# Patient Record
Sex: FEMALE | Race: WHITE | NOT HISPANIC OR LATINO | Employment: OTHER | URBAN - METROPOLITAN AREA
[De-identification: names, ages, dates, MRNs, and addresses within clinical notes are randomized per-mention and may not be internally consistent; named-entity substitution may affect disease eponyms.]

---

## 2017-02-17 RX ORDER — PANTOPRAZOLE SODIUM 40 MG/1
40 TABLET, DELAYED RELEASE ORAL 2 TIMES DAILY
Qty: 180 TAB | Refills: 3 | Status: SHIPPED | OUTPATIENT
Start: 2017-02-17 | End: 2017-09-21 | Stop reason: SDUPTHER

## 2017-03-23 ENCOUNTER — HOSPITAL ENCOUNTER (EMERGENCY)
Facility: MEDICAL CENTER | Age: 62
End: 2017-03-23
Attending: EMERGENCY MEDICINE
Payer: COMMERCIAL

## 2017-03-23 ENCOUNTER — APPOINTMENT (OUTPATIENT)
Dept: RADIOLOGY | Facility: MEDICAL CENTER | Age: 62
End: 2017-03-23
Attending: EMERGENCY MEDICINE
Payer: COMMERCIAL

## 2017-03-23 VITALS
WEIGHT: 115.08 LBS | TEMPERATURE: 100 F | BODY MASS INDEX: 19.65 KG/M2 | SYSTOLIC BLOOD PRESSURE: 131 MMHG | DIASTOLIC BLOOD PRESSURE: 61 MMHG | HEART RATE: 73 BPM | OXYGEN SATURATION: 97 % | HEIGHT: 64 IN | RESPIRATION RATE: 16 BRPM

## 2017-03-23 DIAGNOSIS — J06.9 UPPER RESPIRATORY TRACT INFECTION, UNSPECIFIED TYPE: ICD-10-CM

## 2017-03-23 DIAGNOSIS — R51.9 ACUTE NONINTRACTABLE HEADACHE, UNSPECIFIED HEADACHE TYPE: ICD-10-CM

## 2017-03-23 PROCEDURE — 700102 HCHG RX REV CODE 250 W/ 637 OVERRIDE(OP): Performed by: EMERGENCY MEDICINE

## 2017-03-23 PROCEDURE — 700111 HCHG RX REV CODE 636 W/ 250 OVERRIDE (IP): Performed by: EMERGENCY MEDICINE

## 2017-03-23 PROCEDURE — 96372 THER/PROPH/DIAG INJ SC/IM: CPT

## 2017-03-23 PROCEDURE — A9270 NON-COVERED ITEM OR SERVICE: HCPCS | Performed by: EMERGENCY MEDICINE

## 2017-03-23 PROCEDURE — 70450 CT HEAD/BRAIN W/O DYE: CPT

## 2017-03-23 PROCEDURE — 99284 EMERGENCY DEPT VISIT MOD MDM: CPT

## 2017-03-23 RX ORDER — BUTALBITAL, ACETAMINOPHEN AND CAFFEINE 50; 325; 40 MG/1; MG/1; MG/1
1 TABLET ORAL EVERY 4 HOURS PRN
Qty: 20 TAB | Refills: 0 | Status: SHIPPED | OUTPATIENT
Start: 2017-03-23 | End: 2018-06-18

## 2017-03-23 RX ORDER — BUTALBITAL, ACETAMINOPHEN AND CAFFEINE 50; 325; 40 MG/1; MG/1; MG/1
1 TABLET ORAL ONCE
Status: COMPLETED | OUTPATIENT
Start: 2017-03-23 | End: 2017-03-23

## 2017-03-23 RX ORDER — KETOROLAC TROMETHAMINE 30 MG/ML
30 INJECTION, SOLUTION INTRAMUSCULAR; INTRAVENOUS ONCE
Status: COMPLETED | OUTPATIENT
Start: 2017-03-23 | End: 2017-03-23

## 2017-03-23 RX ORDER — ACETAMINOPHEN 325 MG/1
650 TABLET ORAL ONCE
Status: COMPLETED | OUTPATIENT
Start: 2017-03-23 | End: 2017-03-23

## 2017-03-23 RX ADMIN — KETOROLAC TROMETHAMINE 30 MG: 30 INJECTION, SOLUTION INTRAMUSCULAR; INTRAVENOUS at 11:16

## 2017-03-23 RX ADMIN — ACETAMINOPHEN 650 MG: 325 TABLET, FILM COATED ORAL at 11:16

## 2017-03-23 RX ADMIN — BUTALBITAL, ACETAMINOPHEN, AND CAFFEINE 1 TABLET: 50; 325; 40 TABLET ORAL at 13:54

## 2017-03-23 ASSESSMENT — PAIN SCALES - GENERAL
PAINLEVEL_OUTOF10: 8
PAINLEVEL_OUTOF10: 8

## 2017-03-23 NOTE — ED AVS SNAPSHOT
3/23/2017          Camryn Peter  [address] [place] [address]    Dear Camryn:    [facility] wants to ensure your discharge home is safe and you or your loved ones have had all your questions answered regarding your care after you leave the hospital.    You may receive a telephone call within two days of your discharge.  This call is to make certain you understand your discharge instructions as well as ensure we provided you with the best care possible during your stay with us.     The call will only last approximately 3-5 minutes and will be done by a nurse.    Once again, we want to ensure your discharge home is safe and that you have a clear understanding of any next steps in your care.  If you have any questions or concerns, please do not hesitate to contact us, we are here for you.  Thank you for choosing [facility] for your healthcare needs.    Sincerely,    David Navarro    [facility]

## 2017-03-23 NOTE — ED AVS SNAPSHOT
Home Care Instructions                                                                                                                Camryn Peter   MRN: [id number]    Department:  [facility], Emergency Dept   Date of Visit:  3/23/2017            [facility], Emergency Dept    [address] [place] [id number]    Phone:  [phone]      You were seen by     Madie Garcia M.D.      Your Diagnosis Was     Acute nonintractable headache, unspecified headache type     R51       These are the medications you received during your hospitalization from 03/23/2017 1012 to 03/23/2017 1406     Date/Time Order Dose Route Action    03/23/2017 1116 ketorolac (TORADOL) injection 30 mg 30 mg Intramuscular Given    03/23/2017 1116 acetaminophen (TYLENOL) tablet 650 mg 650 mg Oral Given    03/23/2017 1354 acetaminophen/caffeine/butalbital 325-40-50 mg (FIORICET) -40 MG per tablet 1 Tab 1 Tab Oral Given      Follow-up Information     1. Follow up with Lynn Buckley D.O..    Specialty:  Family Medicine    Why:  As needed    Contact information    Lexa Herrera ashley  [address] [address] [phone]          2. Follow up with [facility], Emergency Dept.    Specialty:  Emergency Medicine    Why:  If symptoms worsen, difficulty breathing or other concerns    Contact information    [address] [place] [id number] [phone]      Medication Information     Review all of your home medications and newly ordered medications with your primary doctor and/or pharmacist as soon as possible. Follow medication instructions as directed by your doctor and/or pharmacist.     Please keep your complete medication list with you and share with your physician. Update the information when medications are discontinued, doses are changed, or new medications (including over-the-counter products) are added; and carry medication information at all times in the event of  emergency situations.               Medication List      START taking these medications        Instructions    Morning Afternoon Evening Bedtime    acetaminophen/caffeine/butalbital 325-40-50 mg -40 MG Tabs   Last time this was given:  1 Tab on 3/23/2017  1:54 PM   Commonly known as:  FIORICET        Take 1 Tab by mouth every four hours as needed for Headache.   Dose:  1 Tab                          ASK your doctor about these medications        Instructions    Morning Afternoon Evening Bedtime    ARNICA MONTANA PO        Take  by mouth.                        BIOTIN PO        Take 10,000 mcg by mouth every day.   Dose:  11312 mcg                        Iron 240 (27 FE) MG Tabs        Take  by mouth every day.                        oxycodone-acetaminophen 5-325 MG Tabs   Commonly known as:  PERCOCET        Take 1-2 Tabs by mouth every 6 hours as needed for Moderate Pain.   Dose:  1-2 Tab                        pantoprazole 40 MG Tbec   Commonly known as:  PROTONIX        Take 1 Tab by mouth 2 times a day.   Dose:  40 mg                        PREMARIN 0.625 MG/GM Crea   Generic drug:  conjugated estrogen        Insert 0.5 g in vagina 2 times a week.   Dose:  0.5 g                        VITAMIN B-12 PO        Take 10,000 mcg by mouth every day.   Dose:  45594 mcg                        Vitamin D3 3000 UNITS Tabs        Take  by mouth every day.                             Where to Get Your Medications      You can get these medications from any pharmacy     Bring a paper prescription for each of these medications    - acetaminophen/caffeine/butalbital 325-40-50 mg -40 MG Tabs            Procedures and tests performed during your visit     CT-HEAD W/O        Discharge Instructions       Migraine Headache  A migraine headache is very bad, throbbing pain on one or both sides of your head. Talk to your doctor about what things may bring on (trigger) your migraine headaches.  HOME CARE  · Only take medicines  as told by your doctor.  · Lie down in a dark, quiet room when you have a migraine.  · Keep a journal to find out if certain things bring on migraine headaches. For example, write down:  ¨ What you eat and drink.  ¨ How much sleep you get.  ¨ Any change to your diet or medicines.  · Lessen how much alcohol you drink.  · Quit smoking if you smoke.  · Get enough sleep.  · Lessen any stress in your life.  · Keep lights dim if bright lights bother you or make your migraines worse.  GET HELP RIGHT AWAY IF:   · Your migraine becomes really bad.  · You have a fever.  · You have a stiff neck.  · You have trouble seeing.  · Your muscles are weak, or you lose muscle control.  · You lose your balance or have trouble walking.  · You feel like you will pass out (faint), or you pass out.  · You have really bad symptoms that are different than your first symptoms.  MAKE SURE YOU:   · Understand these instructions.  · Will watch your condition.  · Will get help right away if you are not doing well or get worse.     This information is not intended to replace advice given to you by your health care provider. Make sure you discuss any questions you have with your health care provider.     Document Released: 09/26/2009 Document Revised: 03/11/2013 Document Reviewed: 08/25/2014  Elsevier Interactive Patient Education ©2016 [employer]            Patient Information     Patient Information    Following emergency treatment: all patient requiring follow-up care must return either to a private physician or a clinic if your condition worsens before you are able to obtain further medical attention, please return to the emergency room.     Billing Information    At [facility], we work to make the billing process streamlined for our patients.  Our Representatives are here to answer any questions you may have regarding your hospital bill.  If you have insurance coverage and have supplied your insurance information to us, we will submit a  claim to your insurer on your behalf.  Should you have any questions regarding your bill, we can be reached online or by phone as follows:  Online: You are able pay your bills online or live chat with our representatives about any billing questions you may have. We are here to help Monday - Friday from 8:00am to 7:30pm and 9:00am - 12:00pm on Saturdays.  Please visit [email or web address]  for more information.   Phone:  [phone] or [phone]    Please note that your emergency physician, surgeon, pathologist, radiologist, anesthesiologist, and other specialists are not employed by [facility] and will therefore bill separately for their services.  Please contact them directly for any questions concerning their bills at the numbers below:     Emergency Physician Services:  [phone]  [facility]:  [phone]  [facility]:  [phone]  [facility]:  [phone]    1. Your final bill may vary from the amount quoted upon discharge if all procedures are not complete at that time, or if your doctor has additional procedures of which we are not aware. You will receive an additional bill if you return to the Emergency Department at [facility] for suture removal regardless of the facility of which the sutures were placed.     2. Please arrange for settlement of this account at the emergency registration.    3. All self-pay accounts are due in full at the time of treatment.  If you are unable to meet this obligation then payment is expected within 4-5 days.     4. If you have had radiology studies (CT, X-ray, Ultrasound, MRI), you have received a preliminary result during your emergency department visit. Please contact the radiology department [phone] to receive a copy of your final result. Please discuss the Final result with your primary physician or with the follow up physician provided.     Crisis Hotline:  National  Crisis Hotline:  4-446-ZNOPQVN or 1-850.471.2858  [place] Crisis Hotline:    1-321.767.8292 or 172-968-0391         ED Discharge Follow Up Questions    1. In order to provide you with very good care, we would like to follow up with a phone call in the next few days.  May we have your permission to contact you?     YES /  NO    2. What is the best phone number to call you? (       )_____-__________    3. What is the best time to call you?      Morning  /  Afternoon  /  Evening                   Patient Signature:  ____________________________________________________________    Date:  ____________________________________________________________

## 2017-03-23 NOTE — ED AVS SNAPSHOT
Best Before Media Access Code: Activation code not generated  Current Best Before Media Status: Active    [employer]  A secure, online tool to manage your health information     [employer]’s Best Before Media® is a secure, online tool that connects you to your personalized health information from the privacy of your home -- day or night - making it very easy for you to manage your healthcare. Once the activation process is completed, you can even access your medical information using the Best Before Media anabel, which is available for free in the Apple Anabel store or Google Play store.     Best Before Media provides the following levels of access (as shown below):   My Chart Features   [facility] Primary Care Doctor [facility]  Specialists [facility]  Urgent  Care Non-[facility]  Primary Care  Doctor   Email your healthcare team securely and privately 24/7 X X X X   Manage appointments: schedule your next appointment; view details of past/upcoming appointments X      Request prescription refills. X      View recent personal medical records, including lab and immunizations X X X X   View health record, including health history, allergies, medications X X X X   Read reports about your outpatient visits, procedures, consult and ER notes X X X X   See your discharge summary, which is a recap of your hospital and/or ER visit that includes your diagnosis, lab results, and care plan. X X       How to register for Best Before Media:  1. Go to  [email or web address].  2. Click on the Sign Up Now box, which takes you to the New Member Sign Up page. You will need to provide the following information:  a. Enter your Best Before Media Access Code exactly as it appears at the top of this page. (You will not need to use this code after you’ve completed the sign-up process. If you do not sign up before the expiration date, you must request a new code.)   b. Enter your date of birth.   c. Enter your home email address.   d. Click Submit, and follow the next screen’s instructions.  3. Create a Best Before Media ID. This will  be your [email or web address] login ID and cannot be changed, so think of one that is secure and easy to remember.  4. Create a [email or web address] password. You can change your password at any time.  5. Enter your Password Reset Question and Answer. This can be used at a later time if you forget your password.   6. Enter your e-mail address. This allows you to receive e-mail notifications when new information is available in [email or web address].  7. Click Sign Up. You can now view your health information.    For assistance activating your [email or web address] account, call [phone]

## 2017-03-23 NOTE — ED PROVIDER NOTES
ED Provider Note    Scribed for Madie Garcia M.D. by Michelle Guerra. 3/23/2017, 10:34 AM.    Primary Care Provider: Lynn Buckley D.O.  Means of arrival: Walk-in  History obtained from: Patient  History limited by: None    CHIEF COMPLAINT  Chief Complaint   Patient presents with   • Headache     Pt BIB self to triage for c/o HA for three days. Pt reports pain to be in left temple/ no hx of migraines/ no relief from tylenol/ no slurred speech/ unilateral defecits.    • Cough   • Fever       HPI  Camryn Peter is a 62 y.o. female who presents to the Emergency Department for headache and fever onset 3 days ago. The patient reports she initially developed a cough 3 days ago, but woke in the middle of the night with a throbbing headache. She has been medicating with Tylenol with no relief, last dose 12AM yesterday, and notes minimal neck stiffness. Patient states she has never experienced a similar headache previously and denies symptoms of production of phlegm or photophobia. The patient has past history of gastric sleeve surgery 3 years ago, but cannot take any NSAIDs.     REVIEW OF SYSTEMS  Pertinent positives include headache, fever, cough, and minimal neck stiffness. Pertinent negatives include no production of phlegm or photophobia. All other systems reviewed and negative. C.     PAST MEDICAL HISTORY   has a past medical history of High cholesterol; Sleep apnea; and Prediabetes.    SOCIAL HISTORY  Social History   Substance Use Topics   • Smoking status: Never Smoker    • Alcohol Use: 0.0 oz/week     0 Standard drinks or equivalent per week      Comment: once a week      History   Drug Use No       SURGICAL HISTORY   has past surgical history that includes gyn surgery; tonsillectomy (1959); appendectomy (1972); hysterectomy, vaginal (1984); blepharoplasty (1997); anesth,surg breast reconstructive (1999); liposuction (2000); gastric sleeve laparoscopy (2014); dental surgery (2014); other (2006); other  "(2011); other (2013); rhytidectomy (2/2/2015); platysmaplasty (2/2/2015); blepharoplasty (2/2/2015); and cholecystectomy.     CURRENT MEDICATIONS  No current facility-administered medications for this encounter.    Current outpatient prescriptions:   •  acetaminophen/caffeine/butalbital 325-40-50 mg (FIORICET) -40 MG Tab, Take 1 Tab by mouth every four hours as needed for Headache., Disp: 20 Tab, Rfl: 0  •  pantoprazole (PROTONIX) 40 MG Tablet Delayed Response, Take 1 Tab by mouth 2 times a day., Disp: 180 Tab, Rfl: 3  •  oxycodone-acetaminophen (PERCOCET) 5-325 MG Tab, Take 1-2 Tabs by mouth every 6 hours as needed for Moderate Pain., Disp: 20 Tab, Rfl: 0  •  Homeopathic Products (ARNICA MONTANA PO), Take  by mouth., Disp: , Rfl:   •  Cholecalciferol (VITAMIN D3) 3000 UNITS TABS, Take  by mouth every day., Disp: , Rfl:   •  Cyanocobalamin (VITAMIN B-12 PO), Take 10,000 mcg by mouth every day., Disp: , Rfl:   •  Ferrous Gluconate (IRON) 240 (27 FE) MG TABS, Take  by mouth every day., Disp: , Rfl:   •  BIOTIN PO, Take 10,000 mcg by mouth every day., Disp: , Rfl:   •  conjugated estrogen (PREMARIN) 0.625 MG/GM CREA, Insert 0.5 g in vagina 2 times a week., Disp: , Rfl:     ALLERGIES  No Known Allergies    PHYSICAL EXAM  VITAL SIGNS: /85 mmHg  Pulse 80  Temp(Src) 37.9 °C (100.3 °F)  Resp 16  Ht 1.626 m (5' 4.02\")  Wt 52.2 kg (115 lb 1.3 oz)  BMI 19.74 kg/m2  SpO2 97%  Constitutional: Alert in no apparent distress.  HENT: No signs of trauma, Bilateral external ears normal, Nose normal.   Eyes: Pupils are equal and reactive, Conjunctiva normal, Non-icteric.   Neck: Normal range of motion, No tenderness, Supple, No stridor.   Cardiovascular: Regular rate and rhythm, no murmurs.   Thorax & Lungs: Normal breath sounds, No respiratory distress, No wheezing, No chest tenderness.   Abdomen: Bowel sounds normal, Soft, No tenderness, No masses, No peritoneal signs.  Skin: Warm, Dry, No erythema, No rash. " "  Musculoskeletal:  No major deformities noted.   Neurologic: Alert, moving all extremities without difficulty, no focal deficits.      RADIOLOGY  CT-HEAD W/O   Final Result      1.  No acute intracranial abnormality.   2.  Probable chronic RIGHT medial orbital wall fracture.      The radiologist's interpretation of all radiological studies have been reviewed by me.    COURSE & MEDICAL DECISION MAKING  Pertinent Labs & Imaging studies reviewed. (See chart for details)    10:34 AM - Patient seen and examined at bedside. Patient will be treated with Toradol 30mg IV and Tylenol 650mg IV. Ordered CT-head to evaluate her symptoms.     1:45 PM Reviewed the patient's CT result, which is shown above. These results were discussed with the patient and will be treated with Fioricet -40mg PO. She reports relief after medication administration. The patient will be discharged with Fioricet and should return if symptoms worsen or if new symptoms arise. The patient understands and agrees to plan. /61 mmHg  Pulse 73  Temp(Src) 37.8 °C (100 °F)  Resp 16  Ht 1.626 m (5' 4.02\")  Wt 52.2 kg (115 lb 1.3 oz)  BMI 19.74 kg/m2  SpO2 97%      Your blood pressure is elevated here in the emergency department. Please monitor your blood pressure over the next several days. If your blood pressure continues to be 120/80 or higher please contact your physician for blood pressure management.     The patient will return for new or worsening symptoms and is stable at the time of discharge. Patient was given return precautions. Patient and/or family member verbalizes understanding and will comply.    DISPOSITION:  Patient will be discharged home in stable condition.    FOLLOW UP:  Lynn Buckley D.O.  [address] [address] [phone]      As needed    [facility], Emergency Dept  [address] [facility] [address] [phone]    If symptoms worsen, difficulty breathing or other " concerns    OUTPATIENT MEDICATION:  Discharge Medication List as of 3/23/2017  2:06 PM      START taking these medications    Details   acetaminophen/caffeine/butalbital 325-40-50 mg (FIORICET) -40 MG Tab Take 1 Tab by mouth every four hours as needed for Headache., Disp-20 Tab, R-0, Print Rx Paper           FINAL IMPRESSION  1. Acute nonintractable headache, unspecified headache type    2. Upper respiratory tract infection, unspecified type      This dictation has been created using voice recognition software and/or scribes. The accuracy of the dictation is limited by the abilities of the software and the expertise of the scribes. I expect there may be some errors of grammar and possibly content. I made every attempt to manually correct the errors within my dictation. However, errors related to voice recognition software and/or scribes may still exist and should be interpreted within the appropriate context.     I, Michelle Guerra (Scribe), am scribing for, and in the presence of, Madie Garcia M.D..    Electronically signed by: Michelle Guerra (Scribe), 3/23/2017    IMadie M.D. personally performed the services described in this documentation, as scribed by Michelle Guerra in my presence, and it is both accurate and complete.    The note accurately reflects work and decisions made by me.  Madie Garcia  3/23/2017  4:40 PM

## 2017-03-23 NOTE — ED NOTES
Chief Complaint   Patient presents with   • Headache     Pt BIB self to triage for c/o HA for three days. Pt reports pain to be in left temple/ no hx of migraines/ no relief from tylenol/ no slurred speech/ unilateral defecits.    • Cough   • Fever     Explained to pt triage process, made pt aware to tell this RN of any changes/concerns, pt verbalized understanding of process and instructions given. Pt to ER teresa.

## 2017-09-21 ENCOUNTER — OFFICE VISIT (OUTPATIENT)
Dept: MEDICAL GROUP | Facility: PHYSICIAN GROUP | Age: 62
End: 2017-09-21
Payer: COMMERCIAL

## 2017-09-21 ENCOUNTER — HOSPITAL ENCOUNTER (OUTPATIENT)
Facility: MEDICAL CENTER | Age: 62
End: 2017-09-21
Attending: NURSE PRACTITIONER
Payer: COMMERCIAL

## 2017-09-21 ENCOUNTER — SUPERVISING PHYSICIAN REVIEW (OUTPATIENT)
Dept: MEDICAL GROUP | Facility: PHYSICIAN GROUP | Age: 62
End: 2017-09-21

## 2017-09-21 VITALS
HEART RATE: 71 BPM | DIASTOLIC BLOOD PRESSURE: 58 MMHG | BODY MASS INDEX: 20.49 KG/M2 | WEIGHT: 120 LBS | TEMPERATURE: 99 F | SYSTOLIC BLOOD PRESSURE: 94 MMHG | HEIGHT: 64 IN | RESPIRATION RATE: 14 BRPM | OXYGEN SATURATION: 95 %

## 2017-09-21 DIAGNOSIS — Z23 NEED FOR VACCINATION: ICD-10-CM

## 2017-09-21 DIAGNOSIS — N30.01 ACUTE CYSTITIS WITH HEMATURIA: ICD-10-CM

## 2017-09-21 LAB
APPEARANCE UR: NORMAL
BILIRUB UR STRIP-MCNC: NORMAL MG/DL
COLOR UR AUTO: YELLOW
GLUCOSE UR STRIP.AUTO-MCNC: NORMAL MG/DL
KETONES UR STRIP.AUTO-MCNC: NORMAL MG/DL
LEUKOCYTE ESTERASE UR QL STRIP.AUTO: NORMAL
NITRITE UR QL STRIP.AUTO: NORMAL
PH UR STRIP.AUTO: 6 [PH] (ref 5–8)
PROT UR QL STRIP: NORMAL MG/DL
RBC UR QL AUTO: NORMAL
SP GR UR STRIP.AUTO: 1.02
UROBILINOGEN UR STRIP-MCNC: NORMAL MG/DL

## 2017-09-21 PROCEDURE — 87077 CULTURE AEROBIC IDENTIFY: CPT

## 2017-09-21 PROCEDURE — 87186 SC STD MICRODIL/AGAR DIL: CPT

## 2017-09-21 PROCEDURE — 81002 URINALYSIS NONAUTO W/O SCOPE: CPT | Performed by: NURSE PRACTITIONER

## 2017-09-21 PROCEDURE — 90686 IIV4 VACC NO PRSV 0.5 ML IM: CPT | Performed by: NURSE PRACTITIONER

## 2017-09-21 PROCEDURE — 90471 IMMUNIZATION ADMIN: CPT | Performed by: NURSE PRACTITIONER

## 2017-09-21 PROCEDURE — 87086 URINE CULTURE/COLONY COUNT: CPT

## 2017-09-21 PROCEDURE — 99214 OFFICE O/P EST MOD 30 MIN: CPT | Mod: 25 | Performed by: NURSE PRACTITIONER

## 2017-09-21 RX ORDER — FLUCONAZOLE 100 MG/1
TABLET ORAL
Qty: 2 TAB | Refills: 1 | Status: SHIPPED | OUTPATIENT
Start: 2017-09-21 | End: 2018-01-16

## 2017-09-21 RX ORDER — NITROFURANTOIN 25; 75 MG/1; MG/1
100 CAPSULE ORAL 2 TIMES DAILY
Qty: 20 CAP | Refills: 0 | Status: SHIPPED | OUTPATIENT
Start: 2017-09-21 | End: 2018-01-16

## 2017-09-21 ASSESSMENT — PATIENT HEALTH QUESTIONNAIRE - PHQ9: CLINICAL INTERPRETATION OF PHQ2 SCORE: 0

## 2017-09-21 NOTE — ASSESSMENT & PLAN NOTE
Symptoms started 4 days ago after intercourse. Patient reports that this is the first time she's had a urinary tract infection. She states that she has always been good about emptying her bladder after intercourse. This lasts him she did not. She has in the past had issues with yeast infections with antibiotics. She is requesting to have Diflucan if she is treated with an antibiotic. Patient also states that she has been, in the past, using Premarin estrogen vaginal cream. She states that she ran out approximately 2 months ago. She is requesting refill for that.  Patient reports urgency frequency and burning. Denies fever chills abdominal pain or flank pain at this time. No nausea or vomiting.

## 2017-09-22 DIAGNOSIS — N30.01 ACUTE CYSTITIS WITH HEMATURIA: ICD-10-CM

## 2017-09-22 NOTE — PROGRESS NOTES
I have reviewed and agree with history, assessment and plan for office encounter on 9/21/17 with midlevel provider: MANI Yarbrough.  Face to face encounter/direct observation: No  Suggested changes to plan or follow-up: none   Lynn Buckley D.O.

## 2017-09-24 LAB
BACTERIA UR CULT: ABNORMAL
SIGNIFICANT IND 70042: ABNORMAL
SITE SITE: ABNORMAL
SOURCE SOURCE: ABNORMAL

## 2018-01-12 ENCOUNTER — APPOINTMENT (OUTPATIENT)
Dept: MEDICAL GROUP | Facility: PHYSICIAN GROUP | Age: 63
End: 2018-01-12
Payer: COMMERCIAL

## 2018-01-16 ENCOUNTER — OFFICE VISIT (OUTPATIENT)
Dept: MEDICAL GROUP | Facility: PHYSICIAN GROUP | Age: 63
End: 2018-01-16
Payer: COMMERCIAL

## 2018-01-16 VITALS
BODY MASS INDEX: 20.83 KG/M2 | HEIGHT: 64 IN | OXYGEN SATURATION: 97 % | HEART RATE: 68 BPM | TEMPERATURE: 98.6 F | WEIGHT: 122 LBS | SYSTOLIC BLOOD PRESSURE: 124 MMHG | DIASTOLIC BLOOD PRESSURE: 70 MMHG | RESPIRATION RATE: 12 BRPM

## 2018-01-16 DIAGNOSIS — R53.83 OTHER FATIGUE: ICD-10-CM

## 2018-01-16 DIAGNOSIS — G47.09 OTHER INSOMNIA: ICD-10-CM

## 2018-01-16 DIAGNOSIS — L70.0 ACNE VULGARIS: ICD-10-CM

## 2018-01-16 DIAGNOSIS — Z98.84 S/P BARIATRIC SURGERY: ICD-10-CM

## 2018-01-16 PROCEDURE — 99214 OFFICE O/P EST MOD 30 MIN: CPT | Performed by: INTERNAL MEDICINE

## 2018-01-16 RX ORDER — ALPRAZOLAM 0.25 MG/1
0.25 TABLET ORAL NIGHTLY PRN
Qty: 30 TAB | Refills: 0 | Status: SHIPPED | OUTPATIENT
Start: 2018-01-16 | End: 2018-02-15

## 2018-01-16 RX ORDER — ADAPALENE AND BENZOYL PEROXIDE 3; 25 MG/G; MG/G
1 GEL TOPICAL DAILY
Qty: 60 G | Refills: 1 | Status: SHIPPED | OUTPATIENT
Start: 2018-01-16 | End: 2018-04-25

## 2018-01-16 RX ORDER — CYANOCOBALAMIN 1000 UG/ML
1000 INJECTION, SOLUTION INTRAMUSCULAR; SUBCUTANEOUS ONCE
Status: COMPLETED | OUTPATIENT
Start: 2018-01-16 | End: 2018-01-16

## 2018-01-16 RX ADMIN — CYANOCOBALAMIN 1000 MCG: 1000 INJECTION, SOLUTION INTRAMUSCULAR; SUBCUTANEOUS at 14:09

## 2018-01-16 NOTE — ASSESSMENT & PLAN NOTE
Pt reports she is chronically on xanax 0.25 mg QHS for insomnia. She takes it 3-4 times per month. She has tried other medications including ambien and other medications that she is unsure of the name of. She states that the medication works well and she does not have adverse effects. Her last prescription lasted her for over a year.

## 2018-01-16 NOTE — PROGRESS NOTES
Subjective:   Camryn Peter is a 62 y.o. female here today for acne vulgaris, fatigue, insomnia    Acne vulgaris  Pt takes epiduo forte chronically for acne vulgaris. She has been on this chronically for about 1.5 years and reports good improvement of symptoms with the medication. She tolerates medication well without any difficulty.     Other fatigue  Pt reports poor energy that has been present for about 6-8 months. She was previously getting vitamin B12 injections with improvement in symptoms but this was stopped around 6-8 months ago as well. She'd like to restart the injections now.     Other insomnia  Pt reports she is chronically on xanax 0.25 mg QHS for insomnia. She takes it 3-4 times per month. She has tried other medications including ambien and other medications that she is unsure of the name of. She states that the medication works well and she does not have adverse effects. Her last prescription lasted her for over a year.       Current medicines (including changes today)  Current Outpatient Prescriptions   Medication Sig Dispense Refill   • Adapalene-Benzoyl Peroxide 0.3-2.5 % Gel 1 Application by Apply externally route every day. 60 g 1   • alprazolam (XANAX) 0.25 MG Tab Take 1 Tab by mouth at bedtime as needed for Sleep for up to 30 days. 30 Tab 0   • Cyanocobalamin 1000 MCG/ML Kit 1 Dose by Injection route Q30 DAYS. 1 Kit 3   • pantoprazole (PROTONIX) 40 MG Tablet Delayed Response Take 1 tablet by mouth two  times daily 180 Tab 1   • conjugated estrogen (PREMARIN) 0.625 MG/GM Cream Insert 1 gram vaginally two days/week 1 Tube 2   • acetaminophen/caffeine/butalbital 325-40-50 mg (FIORICET) -40 MG Tab Take 1 Tab by mouth every four hours as needed for Headache. (Patient not taking: Reported on 1/16/2018) 20 Tab 0   • Cholecalciferol (VITAMIN D3) 3000 UNITS TABS Take  by mouth every day.     • Ferrous Gluconate (IRON) 240 (27 FE) MG TABS Take  by mouth every day.     • conjugated estrogen  "(PREMARIN) 0.625 MG/GM CREA Insert 0.5 g in vagina 2 times a week.       Current Facility-Administered Medications   Medication Dose Route Frequency Provider Last Rate Last Dose   • cyanocobalamin (VITAMIN B-12) injection 1,000 mcg  1,000 mcg Intramuscular Once Omi Cason M.D.         She  has a past medical history of High cholesterol; Prediabetes; Sleep apnea; and Urinary tract infection, site not specified.    ROS   No chest pain, no shortness of breath     Objective:     Blood pressure 124/70, pulse 68, temperature 37 °C (98.6 °F), resp. rate 12, height 1.626 m (5' 4\"), weight 55.3 kg (122 lb), SpO2 97 %. Body mass index is 20.94 kg/m².   Physical Exam:  Constitutional: Alert & oriented, no acute distress  Eye: Conjunctiva clear, lids normal, no discharge  ENMT: Lips without lesions, normal external nose and ears  Neck: Trachea midline, no masses, no thyromegaly. No cervical or supraclavicular lymphadenopathy  Respiratory: Unlabored respiratory effort, lungs clear to auscultation, no wheezes, no ronchi  Cardiovascular: Normal S1, S2, no murmur  Skin: small pustular lesions on bilateral cheeks, total of 3 lesions seen  Neuro: No overt focal neurologic deficits  Psych: Normal mood and affect      Assessment and Plan:   The following treatment plan was discussed    1. Acne vulgaris  Will refill patient's chronic medication for this  - Adapalene-Benzoyl Peroxide 0.3-2.5 % Gel; 1 Application by Apply externally route every day.  Dispense: 60 g; Refill: 1    2. Other fatigue  Will repeat baseline labs including thyroid, vitamins B12 and D, and magnesium. Will give patient vitamin B12 injection today and send kit to pharmacy for monthly injections as this has worked well for her in the past. Pt can follow up with PCP for continued treatment  - Cyanocobalamin 1000 MCG/ML Kit; 1 Dose by Injection route Q30 DAYS.  Dispense: 1 Kit; Refill: 3  - CBC WITH DIFFERENTIAL; Future  - COMP METABOLIC PANEL; Future  - TSH WITH " REFLEX TO FT4; Future  - VITAMIN B12; Future  - MAGNESIUM; Future    3. Other insomnia  Discussed risks involved with medication. Pt has reportedly tried multiple other insomnia medications. She uses medication sparingly. Will give one time refill today but pt will have to discuss this with her PCP for consideration of chronic use of this medication  - alprazolam (XANAX) 0.25 MG Tab; Take 1 Tab by mouth at bedtime as needed for Sleep for up to 30 days.  Dispense: 30 Tab; Refill: 0  - VITAMIN D,25 HYDROXY; Future    4. S/P bariatric surgery  Weight is still in the normal range. Will get lab work.   - CBC WITH DIFFERENTIAL; Future  - COMP METABOLIC PANEL; Future  - TSH WITH REFLEX TO FT4; Future  - VITAMIN B12; Future  - MAGNESIUM; Future  - VITAMIN D,25 HYDROXY; Future      Followup: Return in about 2 months (around 3/16/2018) for with PCP.    Please note that this dictation was created using voice recognition software. I have made every reasonable attempt to correct obvious errors, but I expect that there are errors of grammar and possibly content that I did not discover before finalizing the note.

## 2018-01-16 NOTE — ASSESSMENT & PLAN NOTE
Pt takes epiduo forte chronically for acne vulgaris. She has been on this chronically for about 1.5 years and reports good improvement of symptoms with the medication. She tolerates medication well without any difficulty.

## 2018-01-16 NOTE — ASSESSMENT & PLAN NOTE
Pt reports poor energy that has been present for about 6-8 months. She was previously getting vitamin B12 injections with improvement in symptoms but this was stopped around 6-8 months ago as well. She'd like to restart the injections now.

## 2018-01-22 DIAGNOSIS — G47.09 OTHER INSOMNIA: ICD-10-CM

## 2018-01-22 NOTE — TELEPHONE ENCOUNTER
Was the patient seen in the last year in this department? Yes     Does patient have an active prescription for medications requested? Yes     Received Request Via: Pharmacy Request to be sent to mail order Pharmacy

## 2018-01-23 RX ORDER — ALPRAZOLAM 0.25 MG/1
0.25 TABLET ORAL NIGHTLY PRN
Qty: 30 TAB | Refills: 0
Start: 2018-01-23 | End: 2018-02-22

## 2018-01-24 ENCOUNTER — HOSPITAL ENCOUNTER (OUTPATIENT)
Dept: LAB | Facility: MEDICAL CENTER | Age: 63
End: 2018-01-24
Attending: INTERNAL MEDICINE
Payer: COMMERCIAL

## 2018-01-24 DIAGNOSIS — Z98.84 S/P BARIATRIC SURGERY: ICD-10-CM

## 2018-01-24 DIAGNOSIS — G47.09 OTHER INSOMNIA: ICD-10-CM

## 2018-01-24 DIAGNOSIS — R53.83 OTHER FATIGUE: ICD-10-CM

## 2018-01-24 LAB
25(OH)D3 SERPL-MCNC: 25 NG/ML (ref 30–100)
BASOPHILS # BLD AUTO: 0.7 % (ref 0–1.8)
BASOPHILS # BLD: 0.04 K/UL (ref 0–0.12)
EOSINOPHIL # BLD AUTO: 0.12 K/UL (ref 0–0.51)
EOSINOPHIL NFR BLD: 2.1 % (ref 0–6.9)
ERYTHROCYTE [DISTWIDTH] IN BLOOD BY AUTOMATED COUNT: 42.7 FL (ref 35.9–50)
HCT VFR BLD AUTO: 39.8 % (ref 37–47)
HGB BLD-MCNC: 13.3 G/DL (ref 12–16)
IMM GRANULOCYTES # BLD AUTO: 0.01 K/UL (ref 0–0.11)
IMM GRANULOCYTES NFR BLD AUTO: 0.2 % (ref 0–0.9)
LYMPHOCYTES # BLD AUTO: 2.66 K/UL (ref 1–4.8)
LYMPHOCYTES NFR BLD: 46.7 % (ref 22–41)
MCH RBC QN AUTO: 32 PG (ref 27–33)
MCHC RBC AUTO-ENTMCNC: 33.4 G/DL (ref 33.6–35)
MCV RBC AUTO: 95.7 FL (ref 81.4–97.8)
MONOCYTES # BLD AUTO: 0.42 K/UL (ref 0–0.85)
MONOCYTES NFR BLD AUTO: 7.4 % (ref 0–13.4)
NEUTROPHILS # BLD AUTO: 2.45 K/UL (ref 2–7.15)
NEUTROPHILS NFR BLD: 42.9 % (ref 44–72)
NRBC # BLD AUTO: 0 K/UL
NRBC BLD-RTO: 0 /100 WBC
PLATELET # BLD AUTO: 258 K/UL (ref 164–446)
PMV BLD AUTO: 11.1 FL (ref 9–12.9)
RBC # BLD AUTO: 4.16 M/UL (ref 4.2–5.4)
TSH SERPL DL<=0.005 MIU/L-ACNC: 1.84 UIU/ML (ref 0.38–5.33)
VIT B12 SERPL-MCNC: 270 PG/ML (ref 211–911)
WBC # BLD AUTO: 5.7 K/UL (ref 4.8–10.8)

## 2018-01-24 PROCEDURE — 82607 VITAMIN B-12: CPT

## 2018-01-24 PROCEDURE — 36415 COLL VENOUS BLD VENIPUNCTURE: CPT

## 2018-01-24 PROCEDURE — 84443 ASSAY THYROID STIM HORMONE: CPT

## 2018-01-24 PROCEDURE — 83735 ASSAY OF MAGNESIUM: CPT

## 2018-01-24 PROCEDURE — 80053 COMPREHEN METABOLIC PANEL: CPT

## 2018-01-24 PROCEDURE — 85025 COMPLETE CBC W/AUTO DIFF WBC: CPT

## 2018-01-24 PROCEDURE — 82306 VITAMIN D 25 HYDROXY: CPT

## 2018-01-25 ENCOUNTER — TELEPHONE (OUTPATIENT)
Dept: MEDICAL GROUP | Facility: PHYSICIAN GROUP | Age: 63
End: 2018-01-25

## 2018-01-25 LAB
ALBUMIN SERPL BCP-MCNC: 3.9 G/DL (ref 3.2–4.9)
ALBUMIN/GLOB SERPL: 1.5 G/DL
ALP SERPL-CCNC: 48 U/L (ref 30–99)
ALT SERPL-CCNC: 16 U/L (ref 2–50)
ANION GAP SERPL CALC-SCNC: 6 MMOL/L (ref 0–11.9)
AST SERPL-CCNC: 20 U/L (ref 12–45)
BILIRUB SERPL-MCNC: 1.1 MG/DL (ref 0.1–1.5)
BUN SERPL-MCNC: 12 MG/DL (ref 8–22)
CALCIUM SERPL-MCNC: 8.8 MG/DL (ref 8.4–10.2)
CHLORIDE SERPL-SCNC: 106 MMOL/L (ref 96–112)
CO2 SERPL-SCNC: 27 MMOL/L (ref 20–33)
CREAT SERPL-MCNC: 0.63 MG/DL (ref 0.5–1.4)
GLOBULIN SER CALC-MCNC: 2.6 G/DL (ref 1.9–3.5)
GLUCOSE SERPL-MCNC: 82 MG/DL (ref 65–99)
MAGNESIUM SERPL-MCNC: 2.2 MG/DL (ref 1.5–2.5)
POTASSIUM SERPL-SCNC: 3.8 MMOL/L (ref 3.6–5.5)
PROT SERPL-MCNC: 6.5 G/DL (ref 6–8.2)
SODIUM SERPL-SCNC: 139 MMOL/L (ref 135–145)

## 2018-01-25 NOTE — TELEPHONE ENCOUNTER
Phone Number Called: [phone] (home)     Message: patient notified, patient requesting LBL, Trigliceride and HDL labs    Left Message for patient to call back: no

## 2018-01-25 NOTE — TELEPHONE ENCOUNTER
----- Message from Omi Cason M.D. sent at 1/25/2018  7:04 AM PST -----  Labs reviewed. Vitamin B12 is in the normal range though on the lower side of the normal range. Vitamin D is also mildly decreased. Thyroid lab is in the normal range.  There is no anemia but the RBC count is mildly decreased. This can be monitored periodically with PCP. I recommend patient take OTC vitamin supplements with Vitamin B12 at 1000 mcg daily and Vitamin D at 2000 IU daily.     Please inform patient. Thank you  - Dr. Cason

## 2018-02-20 DIAGNOSIS — R53.83 OTHER FATIGUE: ICD-10-CM

## 2018-02-20 RX ORDER — PANTOPRAZOLE SODIUM 40 MG/1
TABLET, DELAYED RELEASE ORAL
Qty: 180 TAB | Refills: 0 | Status: SHIPPED | OUTPATIENT
Start: 2018-02-20 | End: 2018-05-08 | Stop reason: SDUPTHER

## 2018-04-25 ENCOUNTER — OFFICE VISIT (OUTPATIENT)
Dept: URGENT CARE | Facility: PHYSICIAN GROUP | Age: 63
End: 2018-04-25
Payer: MEDICARE

## 2018-04-25 ENCOUNTER — APPOINTMENT (OUTPATIENT)
Dept: RADIOLOGY | Facility: MEDICAL CENTER | Age: 63
End: 2018-04-25
Attending: EMERGENCY MEDICINE
Payer: MEDICARE

## 2018-04-25 ENCOUNTER — HOSPITAL ENCOUNTER (EMERGENCY)
Facility: MEDICAL CENTER | Age: 63
End: 2018-04-25
Attending: EMERGENCY MEDICINE
Payer: MEDICARE

## 2018-04-25 VITALS
DIASTOLIC BLOOD PRESSURE: 70 MMHG | HEART RATE: 74 BPM | WEIGHT: 117 LBS | BODY MASS INDEX: 19.97 KG/M2 | OXYGEN SATURATION: 97 % | HEIGHT: 64 IN | TEMPERATURE: 98.9 F | SYSTOLIC BLOOD PRESSURE: 112 MMHG

## 2018-04-25 VITALS
RESPIRATION RATE: 18 BRPM | WEIGHT: 117 LBS | OXYGEN SATURATION: 97 % | SYSTOLIC BLOOD PRESSURE: 108 MMHG | TEMPERATURE: 99 F | BODY MASS INDEX: 20.08 KG/M2 | DIASTOLIC BLOOD PRESSURE: 46 MMHG | HEART RATE: 65 BPM

## 2018-04-25 DIAGNOSIS — N83.202 OVARIAN CYST, LEFT: ICD-10-CM

## 2018-04-25 DIAGNOSIS — R10.13 EPIGASTRIC PAIN: ICD-10-CM

## 2018-04-25 LAB
ALBUMIN SERPL BCP-MCNC: 4.4 G/DL (ref 3.2–4.9)
ALBUMIN/GLOB SERPL: 1.6 G/DL
ALP SERPL-CCNC: 58 U/L (ref 30–99)
ALT SERPL-CCNC: 14 U/L (ref 2–50)
ANION GAP SERPL CALC-SCNC: 8 MMOL/L (ref 0–11.9)
APPEARANCE UR: CLEAR
AST SERPL-CCNC: 13 U/L (ref 12–45)
BASOPHILS # BLD AUTO: 0.7 % (ref 0–1.8)
BASOPHILS # BLD: 0.05 K/UL (ref 0–0.12)
BILIRUB SERPL-MCNC: 1.8 MG/DL (ref 0.1–1.5)
BLOOD CULTURE HOLD CXBCH: NORMAL
BUN SERPL-MCNC: 16 MG/DL (ref 8–22)
CALCIUM SERPL-MCNC: 9.3 MG/DL (ref 8.5–10.5)
CHLORIDE SERPL-SCNC: 104 MMOL/L (ref 96–112)
CO2 SERPL-SCNC: 27 MMOL/L (ref 20–33)
COLOR UR AUTO: YELLOW
CREAT SERPL-MCNC: 0.71 MG/DL (ref 0.5–1.4)
EKG IMPRESSION: NORMAL
EOSINOPHIL # BLD AUTO: 0.04 K/UL (ref 0–0.51)
EOSINOPHIL NFR BLD: 0.6 % (ref 0–6.9)
ERYTHROCYTE [DISTWIDTH] IN BLOOD BY AUTOMATED COUNT: 38.5 FL (ref 35.9–50)
GLOBULIN SER CALC-MCNC: 2.7 G/DL (ref 1.9–3.5)
GLUCOSE SERPL-MCNC: 90 MG/DL (ref 65–99)
GLUCOSE UR QL STRIP.AUTO: NEGATIVE MG/DL
HCG UR QL: NEGATIVE
HCT VFR BLD AUTO: 42.6 % (ref 37–47)
HGB BLD-MCNC: 14.4 G/DL (ref 12–16)
IMM GRANULOCYTES # BLD AUTO: 0.02 K/UL (ref 0–0.11)
IMM GRANULOCYTES NFR BLD AUTO: 0.3 % (ref 0–0.9)
KETONES UR QL STRIP.AUTO: 40 MG/DL
LEUKOCYTE ESTERASE UR QL STRIP.AUTO: NEGATIVE
LIPASE SERPL-CCNC: 33 U/L (ref 11–82)
LYMPHOCYTES # BLD AUTO: 2.39 K/UL (ref 1–4.8)
LYMPHOCYTES NFR BLD: 33.1 % (ref 22–41)
MCH RBC QN AUTO: 30.9 PG (ref 27–33)
MCHC RBC AUTO-ENTMCNC: 33.8 G/DL (ref 33.6–35)
MCV RBC AUTO: 91.4 FL (ref 81.4–97.8)
MONOCYTES # BLD AUTO: 0.48 K/UL (ref 0–0.85)
MONOCYTES NFR BLD AUTO: 6.6 % (ref 0–13.4)
NEUTROPHILS # BLD AUTO: 4.24 K/UL (ref 2–7.15)
NEUTROPHILS NFR BLD: 58.7 % (ref 44–72)
NITRITE UR QL STRIP.AUTO: NEGATIVE
NRBC # BLD AUTO: 0 K/UL
NRBC BLD-RTO: 0 /100 WBC
PH UR STRIP.AUTO: 5 [PH]
PLATELET # BLD AUTO: 278 K/UL (ref 164–446)
PMV BLD AUTO: 11 FL (ref 9–12.9)
POTASSIUM SERPL-SCNC: 4 MMOL/L (ref 3.6–5.5)
PROT SERPL-MCNC: 7.1 G/DL (ref 6–8.2)
PROT UR QL STRIP: NEGATIVE MG/DL
RBC # BLD AUTO: 4.66 M/UL (ref 4.2–5.4)
RBC UR QL AUTO: NEGATIVE
SODIUM SERPL-SCNC: 139 MMOL/L (ref 135–145)
SP GR UR: 1.02
WBC # BLD AUTO: 7.2 K/UL (ref 4.8–10.8)

## 2018-04-25 PROCEDURE — 83690 ASSAY OF LIPASE: CPT

## 2018-04-25 PROCEDURE — 700111 HCHG RX REV CODE 636 W/ 250 OVERRIDE (IP): Performed by: EMERGENCY MEDICINE

## 2018-04-25 PROCEDURE — 81025 URINE PREGNANCY TEST: CPT

## 2018-04-25 PROCEDURE — 93005 ELECTROCARDIOGRAM TRACING: CPT

## 2018-04-25 PROCEDURE — 700117 HCHG RX CONTRAST REV CODE 255: Performed by: EMERGENCY MEDICINE

## 2018-04-25 PROCEDURE — 80053 COMPREHEN METABOLIC PANEL: CPT

## 2018-04-25 PROCEDURE — 99285 EMERGENCY DEPT VISIT HI MDM: CPT

## 2018-04-25 PROCEDURE — 76830 TRANSVAGINAL US NON-OB: CPT

## 2018-04-25 PROCEDURE — 81002 URINALYSIS NONAUTO W/O SCOPE: CPT

## 2018-04-25 PROCEDURE — 96374 THER/PROPH/DIAG INJ IV PUSH: CPT

## 2018-04-25 PROCEDURE — 74177 CT ABD & PELVIS W/CONTRAST: CPT

## 2018-04-25 PROCEDURE — 36415 COLL VENOUS BLD VENIPUNCTURE: CPT

## 2018-04-25 PROCEDURE — 99214 OFFICE O/P EST MOD 30 MIN: CPT | Performed by: NURSE PRACTITIONER

## 2018-04-25 PROCEDURE — 85025 COMPLETE CBC W/AUTO DIFF WBC: CPT

## 2018-04-25 PROCEDURE — 93005 ELECTROCARDIOGRAM TRACING: CPT | Performed by: EMERGENCY MEDICINE

## 2018-04-25 RX ORDER — KETOROLAC TROMETHAMINE 30 MG/ML
10 INJECTION, SOLUTION INTRAMUSCULAR; INTRAVENOUS ONCE
Status: COMPLETED | OUTPATIENT
Start: 2018-04-25 | End: 2018-04-25

## 2018-04-25 RX ORDER — ALPRAZOLAM 0.25 MG/1
0.25 TABLET ORAL NIGHTLY PRN
COMMUNITY
End: 2018-06-14

## 2018-04-25 RX ADMIN — KETOROLAC TROMETHAMINE 9.99 MG: 30 INJECTION, SOLUTION INTRAMUSCULAR at 19:28

## 2018-04-25 RX ADMIN — IOHEXOL 100 ML: 350 INJECTION, SOLUTION INTRAVENOUS at 19:15

## 2018-04-25 ASSESSMENT — ENCOUNTER SYMPTOMS
VOMITING: 0
CONSTIPATION: 0
DIARRHEA: 0
MYALGIAS: 0
FEVER: 0
BELCHING: 0
NAUSEA: 0
CHILLS: 0
BRUISES/BLEEDS EASILY: 0
ABDOMINAL PAIN: 1
HEMATOCHEZIA: 0

## 2018-04-25 ASSESSMENT — PAIN SCALES - GENERAL
PAINLEVEL_OUTOF10: 7
PAINLEVEL_OUTOF10: 7
PAINLEVEL_OUTOF10: 0
PAINLEVEL_OUTOF10: 0

## 2018-04-25 NOTE — ED TRIAGE NOTES
.  Chief Complaint   Patient presents with   • Abdominal Pain     lower abdominal pain radiating to upper abd onset saturday     To triage in w/c sent by  pt with above c/c. occ nausea without emesis. Charge RN notified aortic screening 1

## 2018-04-25 NOTE — PROGRESS NOTES
"Subjective:      Camryn Peter is a 63 y.o. female who presents with Abdominal Pain (tearing sensation in stomach area X 4 days )            Medications, Allergies and Prior Medical Hx reviewed and updated in [facility].with patient/family today     Patient was 3 days of ripping tearing pain in her upper abdomen radiating down to her lower abdomen. Increases when she tries to sit or lay down. Patient denies any nausea, vomiting, diarrhea or constipation. She denies any fevers or chills. Patient has had a gastric sleeve, cholecystectomy, appendectomy and hysterectomy. Previously.      Abdominal Pain   This is a new problem. The current episode started in the past 7 days (3 days ago). The onset quality is sudden. The problem occurs constantly. The problem has been unchanged. The pain is located in the epigastric region. The pain is mild. The quality of the pain is tearing. The abdominal pain radiates to the LLQ. Pertinent negatives include no belching, constipation, diarrhea, dysuria, fever, frequency, hematochezia, hematuria, myalgias, nausea or vomiting. The pain is aggravated by movement. The pain is relieved by being still. She has tried nothing for the symptoms. The treatment provided no relief. Her past medical history is significant for abdominal surgery.       Review of Systems   Constitutional: Negative for chills, fever and malaise/fatigue.   Cardiovascular: Negative for chest pain.   Gastrointestinal: Positive for abdominal pain. Negative for constipation, diarrhea, hematochezia, nausea and vomiting.   Genitourinary: Negative for dysuria, frequency and hematuria.   Musculoskeletal: Negative for myalgias.   Endo/Heme/Allergies: Does not bruise/bleed easily.          Objective:     /70   Pulse 74   Temp 37.2 °C (98.9 °F)   Ht 1.626 m (5' 4\")   Wt 53.1 kg (117 lb)   SpO2 97%   BMI 20.08 kg/m²      Physical Exam   Constitutional: She appears well-developed and well-nourished. No distress.   HENT:   Head: " Normocephalic and atraumatic.   Eyes: Conjunctivae are normal. Pupils are equal, round, and reactive to light.   Neck: Neck supple.   Cardiovascular: Normal rate, regular rhythm and normal heart sounds.    Pulses:       Femoral pulses are 3+ on the right side, and 3+ on the left side.  Pulmonary/Chest: Effort normal and breath sounds normal. No respiratory distress.   Abdominal: Soft. Bowel sounds are normal. She exhibits pulsatile midline mass. She exhibits no distension and no fluid wave. There is tenderness in the epigastric area, left upper quadrant and left lower quadrant. There is no rigidity, no guarding, no CVA tenderness, no tenderness at McBurney's point and negative Ordonez's sign.       Musculoskeletal: She exhibits no edema.   Neurological: She is alert.   Awake, alert, answering questions appropriately, moving all extremeties   Skin: Skin is warm and dry. Capillary refill takes less than 2 seconds.   Psychiatric: She has a normal mood and affect. Her behavior is normal.   Vitals reviewed.              Assessment/Plan:     1. Epigastric pain       Ripping, tearing abdominal pain with a pulsatile mass. I'm concerned about an aneurysm. Believe the patient should go to the emergency room immediately. Strongly recommended. Ambulance transport. She refused     D/w pt/family recommendation to transfer to ED for evaluation and treatment not available at this facility, they verbalize agreement and request [facility]  D/w Dr Buck  who accepted patient   Recommended transport by ambulance pt/family refused. Pt will be transported via pvt vehicle with

## 2018-04-26 NOTE — ED PROVIDER NOTES
CHIEF COMPLAINT  Chief Complaint   Patient presents with   • Abdominal Pain     lower abdominal pain radiating to upper abd onset saturday       HPI  Camryn Peter is a 63 y.o. female who presents left sided, left lower quadrant abdominal pain since Saturday. Denies nausea or vomiting. Denies fevers. Denies diarrhea. No pain with defecation or bloody stool. Normal bowel movements. States that the pain radiates up in her abdomen. Denies back pain. No dysuria or hematuria. Pain is worse with movement. No fevers. No chest pain or shortness of breath.    The patient has significant abdominal surgery history including gastric sleeve, appendectomy, cholecystectomy, vaginal approach hysterectomy.    REVIEW OF SYSTEMS  See HPI for further details. All other systems are negative.     PAST MEDICAL HISTORY   has a past medical history of High cholesterol; Prediabetes; Sleep apnea; and Urinary tract infection, site not specified.    SOCIAL HISTORY  Social History     Social History Main Topics   • Smoking status: Never Smoker   • Smokeless tobacco: Never Used   • Alcohol use 0.0 oz/week      Comment: once a week   • Drug use: No   • Sexual activity: Yes     Partners: Male       SURGICAL HISTORY   has a past surgical history that includes gyn surgery; tonsillectomy (1959); appendectomy (1972); hysterectomy, vaginal (1984); blepharoplasty (1997); anesth,surg breast reconstructive (1999); liposuction (2000); gastric sleeve laparoscopy (2014); dental surgery (2014); other (2006); other (2011); other (2013); rhytidectomy (2/2/2015); platysmaplasty (2/2/2015); blepharoplasty (2/2/2015); and cholecystectomy.    CURRENT MEDICATIONS  Home Medications     Reviewed by Esthela Allred R.N. (Registered Nurse) on 04/25/18 at 1714  Med List Status: Complete   Medication Last Dose Status   acetaminophen/caffeine/butalbital 325-40-50 mg (FIORICET) -40 MG Tab Not Taking Active   ALPRAZolam (XANAX) 0.25 MG Tab  Active   Cholecalciferol  (VITAMIN D3) 3000 UNITS TABS 4/25/2018 Active   conjugated estrogen (PREMARIN) 0.625 MG/GM CREA 4/23/2018 Active   conjugated estrogen (PREMARIN) 0.625 MG/GM Cream  Active   cyanocobalamin (VITAMIN B-12) 1000 MCG/ML Solution 4/11/2018 Active   pantoprazole (PROTONIX) 40 MG Tablet Delayed Response 4/25/2018 Active                ALLERGIES  No Known Allergies    PHYSICAL EXAM  VITAL SIGNS: /51   Pulse 60   Temp 37 °C (98.6 °F)   Resp 16   Wt 53.1 kg (117 lb)   SpO2 100%   Breastfeeding? No   BMI 20.08 kg/m²   Pulse ox interpretation: I interpret this pulse ox as normal.  Constitutional: Alert in no apparent distress.  HENT: No signs of trauma, Bilateral external ears normal, Nose normal.   Cardiovascular: Regular rate and rhythm, no murmurs.   Thorax & Lungs: Normal breath sounds, No respiratory distress, No wheezing, No chest tenderness.   Abdomen: Bowel sounds normal, Soft, left lower quadrant tenderness, No masses, No pulsatile masses. No peritoneal signs.  Skin: Warm, Dry, No erythema, No rash.   Back: No bony tenderness, No CVA tenderness.   Extremities: Intact distal pulses, No edema, No tenderness, No cyanosis      DIAGNOSTIC STUDIES / PROCEDURES      LABS  Labs Reviewed   COMP METABOLIC PANEL - Abnormal; Notable for the following:        Result Value    Total Bilirubin 1.8 (*)     All other components within normal limits   POC UA - Abnormal; Notable for the following:     POC Ketones 40 (*)     All other components within normal limits   CBC WITH DIFFERENTIAL   LIPASE   ESTIMATED GFR   BLOOD CULTURE,HOLD   POC URINE PREGNANCY       RADIOLOGY  US-GYN-PELVIS TRANSVAGINAL   Final Result            1. Large complex cystic mass with septation in the left ovary. No definite internal flow seen. Given the size and complexity, further evaluation with MRI and surgical consultation is recommended.      CT-ABDOMEN-PELVIS WITH   Final Result      1.  There is a complex cystic lesion in the left posterior  pelvis, most likely related to the left adnexa. There is no associated free fluid.   2.  There is otherwise no acute inflammatory process in the abdomen or pelvis.   3.  There is postoperative change involving the GE junction and proximal stomach.          COURSE & MEDICAL DECISION MAKING  Pertinent Labs & Imaging studies reviewed. (See chart for details)  63 y.o. female presenting with left lower quadrant pain and tenderness. Denies any bloody stool or black stool. Denies any pain with defecation. Denies any vomiting. Pain is worse with movement. No pain while at rest. Pain has been ongoing for the past few days. Multiple abdominal surgeries in the past including vaginal hysterectomy.    Given the location of the patient's discomfort, diverticulitis is a possibility. Could also be ovarian pathology.    Patient does not appear to be in any distress at this time. Unremarkable vital signs without tachycardia, fever.    Laboratory studies were reassuring and unremarkable. Given the patient's abdominal pain symptoms however, CT examination was performed. Found to have complex cystic lesion to the left posterior pelvis. No free fluid or other inflammatory process was identified.    Limited ultrasound was performed for further evaluation. Found to have a large complex cystic mass with septation in the left ovary. Patient was informed of all of the results. She states feeling much improved overall and only has pain with movement. No vomiting. No distress. She is very anxious to leave the hospital. Did agree to wait for consultation with GYN. Spoke with Dr. Lala from GYN. States that the patient can follow-up with her office for further outpatient management. No emergency interventions recommended at this time.    Patient was informed of all results and instructed to report to the emergency department immediately for any worsening of her symptoms or development of any other concerning signs or symptoms. Cannot fully  rule out cancerous process at this time. She was informed of the radiology recommendations of further imaging with MRI. Patient absolutely does not want to stay any longer for further testing. Given the patient's common demeanor and no active pain symptoms, unlikely ovarian torsion at this time. There is some possibility of torsion detorsion however patient only has pain symptoms with movement. No vomiting. Pain does not appear to be an incapacitating. No evidence of diverticulitis on CT examination. Normal bowel movements. No evidence of obstruction.    Patient overall appears stable for discharge at this time.    The patient will return for worsening symptoms or failure of improvement and is stable at the time of discharge. The patient verbalizes understanding in their own words.    /46   Pulse 65   Temp 37.2 °C (99 °F)   Resp 18   Wt 53.1 kg (117 lb)   SpO2 97%   Breastfeeding? No   BMI 20.08 kg/m²     Barbara Lala M.D.  [address] [facility]  [facility] [address]  [phone]    Schedule an appointment as soon as possible for a visit      [facility], Emergency Dept  [address] [place] [id number] [phone]    As needed, If symptoms worsen    Lynn Buckley D.O.  [address] [facility] [address] [facility] [facility]  [address]  [facility] [address] [phone]          FINAL IMPRESSION  1. Ovarian cyst, left            Electronically signed by: Denilson Monroe, 4/25/2018 5:23 PM

## 2018-04-26 NOTE — ED NOTES
PIV removed. Catheter intact. Dressing applied. Bleeding controlled. D/C instructions reviewed with pt. Pt states understanding and need for follow-up with OB/GYN. Pt left ambulatory with spouse at side. Spouse will drive home.

## 2018-04-26 NOTE — DISCHARGE INSTRUCTIONS
Ovarian Cyst   An ovarian cyst is a fluid-filled sac that forms on an ovary. The ovaries are small organs that produce eggs in women. Various types of cysts can form on the ovaries. Some may cause symptoms and require treatment. Most ovarian cysts go away on their own, are not cancerous (are benign), and do not cause problems.  Common types of ovarian cysts include:  · Functional (follicle) cysts.  ¨ Occur during the menstrual cycle, and usually go away with the next menstrual cycle if you do not get pregnant.  ¨ Usually cause no symptoms.  · Endometriomas.  ¨ Are cysts that form from the tissue that lines the uterus (endometrium).  ¨ Are sometimes called “chocolate cysts” because they become filled with blood that turns brown.  ¨ Can cause pain in the lower abdomen during intercourse and during your period.  · Cystadenoma cysts.  ¨ Develop from cells on the outside surface of the ovary.  ¨ Can get very large and cause lower abdomen pain and pain with intercourse.  ¨ Can cause severe pain if they twist or break open (rupture).  · Dermoid cysts.  ¨ Are sometimes found in both ovaries.  ¨ May contain different kinds of body tissue, such as skin, teeth, hair, or cartilage.  ¨ Usually do not cause symptoms unless they get very big.  · Theca lutein cysts.  ¨ Occur when too much of a certain hormone (human chorionic gonadotropin) is produced and overstimulates the ovaries to produce an egg.  ¨ Are most common after having procedures used to assist with the conception of a baby (in vitro fertilization).  What are the causes?  Ovarian cysts may be caused by:  · Ovarian hyperstimulation syndrome. This is a condition that can develop from taking fertility medicines. It causes multiple large ovarian cysts to form.  · Polycystic ovarian syndrome (PCOS). This is a common hormonal disorder that can cause ovarian cysts, as well as problems with your period or fertility.  What increases the risk?  The following factors may make you  more likely to develop ovarian cysts:  · Being overweight or obese.  · Taking fertility medicines.  · Taking certain forms of hormonal birth control.  · Smoking.  What are the signs or symptoms?  Many ovarian cysts do not cause symptoms. If symptoms are present, they may include:  · Pelvic pain or pressure.  · Pain in the lower abdomen.  · Pain during sex.  · Abdominal swelling.  · Abnormal menstrual periods.  · Increasing pain with menstrual periods.  How is this diagnosed?  These cysts are commonly found during a routine pelvic exam. You may have tests to find out more about the cyst, such as:  · Ultrasound.  · X-ray of the pelvis.  · CT scan.  · MRI.  · Blood tests.  How is this treated?  Many ovarian cysts go away on their own without treatment. Your health care provider may want to check your cyst regularly for 2-3 months to see if it changes. If you are in menopause, it is especially important to have your cyst monitored closely because menopausal women have a higher rate of ovarian cancer.  When treatment is needed, it may include:  · Medicines to help relieve pain.  · A procedure to drain the cyst (aspiration).  · Surgery to remove the whole cyst.  · Hormone treatment or birth control pills. These methods are sometimes used to help dissolve a cyst.  Follow these instructions at home:  · Take over-the-counter and prescription medicines only as told by your health care provider.  · Do not drive or use heavy machinery while taking prescription pain medicine.  · Get regular pelvic exams and Pap tests as often as told by your health care provider.  · Return to your normal activities as told by your health care provider. Ask your health care provider what activities are safe for you.  · Do not use any products that contain nicotine or tobacco, such as cigarettes and e-cigarettes. If you need help quitting, ask your health care provider.  · Keep all follow-up visits as told by your health care provider. This is  important.  Contact a health care provider if:  · Your periods are late, irregular, or painful, or they stop.  · You have pelvic pain that does not go away.  · You have pressure on your bladder or trouble emptying your bladder completely.  · You have pain during sex.  · You have any of the following in your abdomen:  ¨ A feeling of fullness.  ¨ Pressure.  ¨ Discomfort.  ¨ Pain that does not go away.  ¨ Swelling.  · You feel generally ill.  · You become constipated.  · You lose your appetite.  · You develop severe acne.  · You start to have more body hair and facial hair.  · You are gaining weight or losing weight without changing your exercise and eating habits.  · You think you may be pregnant.  Get help right away if:  · You have abdominal pain that is severe or gets worse.  · You cannot eat or drink without vomiting.  · You suddenly develop a fever.  · Your menstrual period is much heavier than usual.  This information is not intended to replace advice given to you by your health care provider. Make sure you discuss any questions you have with your health care provider.  Document Released: 12/18/2006 Document Revised: 07/07/2017 Document Reviewed: 05/21/2017  [employer] Interactive Patient Education © 2017 [employer]

## 2018-04-26 NOTE — ED NOTES
Pt ambulated to restroom. Pt back to bed without incident. Per US tech approximate ETA is 45 min. Pt instructed that everything would need to be removed from the waste down for the US. Pt states understanding.

## 2018-04-26 NOTE — ED TRIAGE NOTES
Pt roomed from [place] via . Pt presents for symptoms as stated in the triage note. Pt states she was sitting on her cough on Saturday when she tried to get up and had a tearing sensation in her lower LLQ ABD with radiation up to the upper ABD and umbilicus progressively over the next few days.

## 2018-05-02 ENCOUNTER — TELEPHONE (OUTPATIENT)
Dept: MEDICAL GROUP | Facility: PHYSICIAN GROUP | Age: 63
End: 2018-05-02

## 2018-05-02 NOTE — TELEPHONE ENCOUNTER
"2. Prior Auth paperwork received from [employer] requiring provider signature.     3. All appropriate fields completed by Medical Assistant: N/A CMA printed and distributed to MA    4. Paperwork placed in \"MA to Provider\" folder/basket. Awaiting provider completion/signature.  "

## 2018-05-03 NOTE — TELEPHONE ENCOUNTER
DOCUMENTATION OF PAR STATUS: call from [employer] to get the additional info needed    1. Name of Medication & Dose: pantoprazole (PROTONIX) 40 MG Tablet Delayed Response     2. Name of Prescription Coverage Company & phone #: Bernadette    3. Date Prior Auth Submitted: 05/02/18    4. What information was given to obtain insurance decision? Dosage stablization    5. Prior Auth Status? Pending 24-72 hr notice    6. Patient Notified: N\A

## 2018-05-04 NOTE — TELEPHONE ENCOUNTER
FINAL PRIOR AUTHORIZATION STATUS:    1.  Name of Medication & Dose: Pantoprazole     2. Prior Auth Status: Approved through 12/31/18     3. Action Taken: Pharmacy Notified: yes Patient Notified: yes

## 2018-05-08 RX ORDER — PANTOPRAZOLE SODIUM 40 MG/1
40 TABLET, DELAYED RELEASE ORAL 2 TIMES DAILY
Qty: 180 TAB | Refills: 3 | Status: SHIPPED | OUTPATIENT
Start: 2018-05-08 | End: 2018-07-22 | Stop reason: SDUPTHER

## 2018-05-21 ENCOUNTER — OFFICE VISIT (OUTPATIENT)
Dept: MEDICAL GROUP | Facility: MEDICAL CENTER | Age: 63
End: 2018-05-21
Payer: MEDICARE

## 2018-05-21 ENCOUNTER — HOSPITAL ENCOUNTER (OUTPATIENT)
Dept: RADIOLOGY | Facility: MEDICAL CENTER | Age: 63
End: 2018-05-21
Attending: FAMILY MEDICINE
Payer: MEDICARE

## 2018-05-21 VITALS
BODY MASS INDEX: 19.95 KG/M2 | WEIGHT: 116.84 LBS | RESPIRATION RATE: 16 BRPM | TEMPERATURE: 99 F | HEART RATE: 82 BPM | DIASTOLIC BLOOD PRESSURE: 60 MMHG | OXYGEN SATURATION: 96 % | SYSTOLIC BLOOD PRESSURE: 100 MMHG | HEIGHT: 64 IN

## 2018-05-21 DIAGNOSIS — M25.522 LEFT ELBOW PAIN: ICD-10-CM

## 2018-05-21 DIAGNOSIS — Z12.31 VISIT FOR SCREENING MAMMOGRAM: ICD-10-CM

## 2018-05-21 DIAGNOSIS — L70.0 ACNE VULGARIS: ICD-10-CM

## 2018-05-21 PROCEDURE — 99213 OFFICE O/P EST LOW 20 MIN: CPT | Performed by: INTERNAL MEDICINE

## 2018-05-21 PROCEDURE — 77067 SCR MAMMO BI INCL CAD: CPT

## 2018-05-21 RX ORDER — DOXYCYCLINE HYCLATE 100 MG
100 TABLET ORAL 2 TIMES DAILY
Qty: 14 TAB | Refills: 0 | Status: SHIPPED | OUTPATIENT
Start: 2018-05-21 | End: 2018-05-27 | Stop reason: SDUPTHER

## 2018-05-21 NOTE — PROGRESS NOTES
CC: Elbow pain and rash.    HPI:   [place] presents today with the following.    1. Left elbow pain  Presents complaining of left elbow pain for the last 5 days.  She does have tennis elbow but this is slightly different.  Denies any falls or specific injury to the area pain is 4 out of 10 intensity.  She is using topical anti-inflammatories.    2. Acne vulgaris  She does suffer from acne chronically has had a breakout.  Nonpainful lesions nothing is oozing topicals have not been helpful.  She is requesting something to acutely help with her symptoms.      Patient Active Problem List    Diagnosis Date Noted   • Acne vulgaris 01/16/2018   • Other fatigue 01/16/2018   • Other insomnia 01/16/2018   • Acute cystitis with hematuria 09/21/2017   • Gastroesophageal reflux disease without esophagitis 03/10/2016   • Other plastic surgery for unacceptable cosmetic appearance 02/02/2015       Current Outpatient Prescriptions   Medication Sig Dispense Refill   • doxycycline (VIBRAMYCIN) 100 MG Tab Take 1 Tab by mouth 2 times a day for 7 days. 14 Tab 0   • pantoprazole (PROTONIX) 40 MG Tablet Delayed Response Take 1 Tab by mouth 2 Times a Day. 180 Tab 3   • conjugated estrogen (PREMARIN) 0.625 MG/GM Cream Insert 1 gram vaginally two days/week 3 Tube 0   • Cholecalciferol (VITAMIN D3) 3000 UNITS TABS Take  by mouth every day.     • ALPRAZolam (XANAX) 0.25 MG Tab Take 0.25 mg by mouth at bedtime as needed for Sleep.     • cyanocobalamin (VITAMIN B-12) 1000 MCG/ML Solution INJECT SUBCUTANEOUSLY 1ML  EVERY 30 DAYS (DISCARD 28 DAYS AFTER  FIRST USE.) 4 mL 3   • acetaminophen/caffeine/butalbital 325-40-50 mg (FIORICET) -40 MG Tab Take 1 Tab by mouth every four hours as needed for Headache. (Patient not taking: Reported on 1/16/2018) 20 Tab 0   • conjugated estrogen (PREMARIN) 0.625 MG/GM CREA Insert 0.5 g in vagina 2 times a week.       No current facility-administered medications for this visit.          Allergies as of  "05/21/2018   • (No Known Allergies)        ROS: Denies Chest pain, SOB, LE edema.    /60   Pulse 82   Temp 37.2 °C (99 °F)   Resp 16   Ht 1.626 m (5' 4\")   Wt 53 kg (116 lb 13.5 oz)   SpO2 96%   BMI 20.06 kg/m²     Physical Exam:  Gen:         Alert and oriented, No apparent distress.  Neck:        No Lymphadenopathy or Bruits.  Lungs:     Clear to auscultation bilaterally  CV:          Regular rate and rhythm. No murmurs, rubs or gallops.               Ext:          No clubbing, cyanosis, edema.      Assessment and Plan.   63 y.o. female with the following issues.    1. Left elbow pain  Consistent with bursitis rest ice anti-inflammatories topically follow up with orthopedist if not feeling better    2. Acne vulgaris  Placing on doxycycline for the next 7 days to try and clear if not improving suggested dermatology.  - doxycycline (VIBRAMYCIN) 100 MG Tab; Take 1 Tab by mouth 2 times a day for 7 days.  Dispense: 14 Tab; Refill: 0      "

## 2018-06-14 DIAGNOSIS — Z01.812 PRE-OPERATIVE LABORATORY EXAMINATION: ICD-10-CM

## 2018-06-14 LAB
ANION GAP SERPL CALC-SCNC: 9 MMOL/L (ref 0–11.9)
BUN SERPL-MCNC: 9 MG/DL (ref 8–22)
CALCIUM SERPL-MCNC: 8.6 MG/DL (ref 8.5–10.5)
CHLORIDE SERPL-SCNC: 106 MMOL/L (ref 96–112)
CO2 SERPL-SCNC: 26 MMOL/L (ref 20–33)
CREAT SERPL-MCNC: 0.49 MG/DL (ref 0.5–1.4)
ERYTHROCYTE [DISTWIDTH] IN BLOOD BY AUTOMATED COUNT: 42.2 FL (ref 35.9–50)
EST. AVERAGE GLUCOSE BLD GHB EST-MCNC: 114 MG/DL
GLUCOSE SERPL-MCNC: 77 MG/DL (ref 65–99)
HBA1C MFR BLD: 5.6 % (ref 0–5.6)
HCT VFR BLD AUTO: 36.6 % (ref 37–47)
HGB BLD-MCNC: 12.2 G/DL (ref 12–16)
MCH RBC QN AUTO: 31.2 PG (ref 27–33)
MCHC RBC AUTO-ENTMCNC: 33.3 G/DL (ref 33.6–35)
MCV RBC AUTO: 93.6 FL (ref 81.4–97.8)
PLATELET # BLD AUTO: 229 K/UL (ref 164–446)
PMV BLD AUTO: 11.8 FL (ref 9–12.9)
POTASSIUM SERPL-SCNC: 3.6 MMOL/L (ref 3.6–5.5)
RBC # BLD AUTO: 3.91 M/UL (ref 4.2–5.4)
SODIUM SERPL-SCNC: 141 MMOL/L (ref 135–145)
WBC # BLD AUTO: 7.3 K/UL (ref 4.8–10.8)

## 2018-06-14 PROCEDURE — 36415 COLL VENOUS BLD VENIPUNCTURE: CPT

## 2018-06-14 PROCEDURE — 87640 STAPH A DNA AMP PROBE: CPT | Mod: XU

## 2018-06-14 PROCEDURE — 80048 BASIC METABOLIC PNL TOTAL CA: CPT

## 2018-06-14 PROCEDURE — 85027 COMPLETE CBC AUTOMATED: CPT

## 2018-06-14 PROCEDURE — 83036 HEMOGLOBIN GLYCOSYLATED A1C: CPT

## 2018-06-14 PROCEDURE — 87641 MR-STAPH DNA AMP PROBE: CPT

## 2018-06-14 RX ORDER — HYDROCODONE BITARTRATE AND ACETAMINOPHEN 10; 325 MG/1; MG/1
1-2 TABLET ORAL EVERY 6 HOURS PRN
COMMUNITY
End: 2018-07-19

## 2018-06-14 NOTE — OR NURSING
"Pre-admit appointment completed. \"Preparing for your procedure\" sheet given to pt along with verbal and written instructions. Pt instructed to continue regularly prescribed medications through the day before surgery. Pt instructed to take the following medications the day of surgery with a sip of water, per anesthesia protocol; protonix, and if needed-fioricet, or norco.     Note pt having GYN surgery 6/21/18. Pt states she went directly to Dr Donovan office to inform and Dr Dias said it was OK to proceed with total shoulder to be done on 6/26/18. LM for Nhung DAO at Dr Donovan office to inform of such.     "

## 2018-06-14 NOTE — OR NURSING
Per Nhung DAO, she reports Dr Donovan is aware of pts GYN surgery prior to total shoulder. OK to proceed.

## 2018-06-14 NOTE — DISCHARGE PLANNING
DISCHARGE PLANNING NOTE - TOTAL JOINT     Procedure: Procedure(s):  SHOULDER ARTHROPLASTY TOTAL - AND OLGUIN  Procedure Date: 6/26/2018  Insurance:  Payor: MEDICARE / Plan: MEDICARE PART A & B / Product Type: *No Product type* /   Equipment currently available at home? N/A  Steps into the home? N/A  Steps within the home? N/A  Toilet height? N/A  Type of shower? N/A  Who will be with you during your recovery? friend  Is Outpatient Physical Therapy set up after surgery? No   Did you take the Total Joint Class and where? No     Plan: I provided the patient with [facility] web-site

## 2018-06-15 LAB
SCCMEC + MECA PNL NOSE NAA+PROBE: NEGATIVE
SCCMEC + MECA PNL NOSE NAA+PROBE: POSITIVE

## 2018-06-18 ENCOUNTER — HOSPITAL ENCOUNTER (OUTPATIENT)
Dept: RADIOLOGY | Facility: MEDICAL CENTER | Age: 63
End: 2018-06-18
Attending: SPECIALIST | Admitting: SPECIALIST
Payer: MEDICARE

## 2018-06-18 DIAGNOSIS — Z01.811 PRE-OPERATIVE RESPIRATORY EXAMINATION: ICD-10-CM

## 2018-06-18 DIAGNOSIS — Z01.812 PRE-OPERATIVE LABORATORY EXAMINATION: ICD-10-CM

## 2018-06-18 LAB
ABO GROUP BLD: NORMAL
APTT PPP: 26.5 SEC (ref 24.7–36)
BLD GP AB SCN SERPL QL: NORMAL
CANCER AG125 SERPL-ACNC: 13.2 U/ML (ref 0–35)
INR PPP: 1 (ref 0.87–1.13)
PROTHROMBIN TIME: 12.9 SEC (ref 12–14.6)
RH BLD: NORMAL

## 2018-06-18 PROCEDURE — 86900 BLOOD TYPING SEROLOGIC ABO: CPT

## 2018-06-18 PROCEDURE — 36415 COLL VENOUS BLD VENIPUNCTURE: CPT

## 2018-06-18 PROCEDURE — 71046 X-RAY EXAM CHEST 2 VIEWS: CPT

## 2018-06-18 PROCEDURE — 86901 BLOOD TYPING SEROLOGIC RH(D): CPT

## 2018-06-18 PROCEDURE — 86304 IMMUNOASSAY TUMOR CA 125: CPT

## 2018-06-18 PROCEDURE — 85730 THROMBOPLASTIN TIME PARTIAL: CPT

## 2018-06-18 PROCEDURE — 85610 PROTHROMBIN TIME: CPT

## 2018-06-18 PROCEDURE — 86850 RBC ANTIBODY SCREEN: CPT

## 2018-06-21 ENCOUNTER — HOSPITAL ENCOUNTER (OUTPATIENT)
Facility: MEDICAL CENTER | Age: 63
End: 2018-06-21
Attending: SPECIALIST | Admitting: SPECIALIST
Payer: MEDICARE

## 2018-06-21 VITALS
TEMPERATURE: 97.6 F | OXYGEN SATURATION: 97 % | SYSTOLIC BLOOD PRESSURE: 104 MMHG | BODY MASS INDEX: 19.61 KG/M2 | DIASTOLIC BLOOD PRESSURE: 50 MMHG | WEIGHT: 110.67 LBS | RESPIRATION RATE: 36 BRPM | HEART RATE: 89 BPM | HEIGHT: 63 IN

## 2018-06-21 LAB
ABO GROUP BLD: NORMAL
BLD GP AB SCN SERPL QL: NORMAL
RH BLD: NORMAL

## 2018-06-21 PROCEDURE — 160031 HCHG SURGERY MINUTES - 1ST 30 MINS LEVEL 5: Performed by: SPECIALIST

## 2018-06-21 PROCEDURE — 700101 HCHG RX REV CODE 250

## 2018-06-21 PROCEDURE — 160009 HCHG ANES TIME/MIN: Performed by: SPECIALIST

## 2018-06-21 PROCEDURE — 86901 BLOOD TYPING SEROLOGIC RH(D): CPT

## 2018-06-21 PROCEDURE — 160036 HCHG PACU - EA ADDL 30 MINS PHASE I: Performed by: SPECIALIST

## 2018-06-21 PROCEDURE — 86900 BLOOD TYPING SEROLOGIC ABO: CPT

## 2018-06-21 PROCEDURE — 88112 CYTOPATH CELL ENHANCE TECH: CPT

## 2018-06-21 PROCEDURE — 160048 HCHG OR STATISTICAL LEVEL 1-5: Performed by: SPECIALIST

## 2018-06-21 PROCEDURE — A9270 NON-COVERED ITEM OR SERVICE: HCPCS

## 2018-06-21 PROCEDURE — 700105 HCHG RX REV CODE 258: Performed by: SPECIALIST

## 2018-06-21 PROCEDURE — 700111 HCHG RX REV CODE 636 W/ 250 OVERRIDE (IP)

## 2018-06-21 PROCEDURE — 160046 HCHG PACU - 1ST 60 MINS PHASE II: Performed by: SPECIALIST

## 2018-06-21 PROCEDURE — 502714 HCHG ROBOTIC SURGERY SERVICES: Performed by: SPECIALIST

## 2018-06-21 PROCEDURE — 88331 PATH CONSLTJ SURG 1 BLK 1SPC: CPT

## 2018-06-21 PROCEDURE — 160025 RECOVERY II MINUTES (STATS): Performed by: SPECIALIST

## 2018-06-21 PROCEDURE — 700101 HCHG RX REV CODE 250: Performed by: SPECIALIST

## 2018-06-21 PROCEDURE — 160002 HCHG RECOVERY MINUTES (STAT): Performed by: SPECIALIST

## 2018-06-21 PROCEDURE — 700102 HCHG RX REV CODE 250 W/ 637 OVERRIDE(OP)

## 2018-06-21 PROCEDURE — 500854 HCHG NEEDLE, INSUFFLATION FOR STEP: Performed by: SPECIALIST

## 2018-06-21 PROCEDURE — 501838 HCHG SUTURE GENERAL: Performed by: SPECIALIST

## 2018-06-21 PROCEDURE — 160042 HCHG SURGERY MINUTES - EA ADDL 1 MIN LEVEL 5: Performed by: SPECIALIST

## 2018-06-21 PROCEDURE — 160035 HCHG PACU - 1ST 60 MINS PHASE I: Performed by: SPECIALIST

## 2018-06-21 PROCEDURE — 88307 TISSUE EXAM BY PATHOLOGIST: CPT

## 2018-06-21 PROCEDURE — 86850 RBC ANTIBODY SCREEN: CPT

## 2018-06-21 PROCEDURE — 88305 TISSUE EXAM BY PATHOLOGIST: CPT | Mod: 59

## 2018-06-21 RX ORDER — ONDANSETRON 2 MG/ML
INJECTION INTRAMUSCULAR; INTRAVENOUS
Status: COMPLETED
Start: 2018-06-21 | End: 2018-06-21

## 2018-06-21 RX ORDER — ALUMINA, MAGNESIA, AND SIMETHICONE 2400; 2400; 240 MG/30ML; MG/30ML; MG/30ML
5 SUSPENSION ORAL ONCE
Status: ON HOLD | COMMUNITY
End: 2018-06-21

## 2018-06-21 RX ORDER — OXYCODONE HCL 5 MG/5 ML
SOLUTION, ORAL ORAL
Status: COMPLETED
Start: 2018-06-21 | End: 2018-06-21

## 2018-06-21 RX ORDER — LIDOCAINE HYDROCHLORIDE 10 MG/ML
INJECTION, SOLUTION INFILTRATION; PERINEURAL
Status: COMPLETED
Start: 2018-06-21 | End: 2018-06-21

## 2018-06-21 RX ORDER — LIDOCAINE HYDROCHLORIDE 10 MG/ML
0.5 INJECTION, SOLUTION INFILTRATION; PERINEURAL
Status: DISCONTINUED | OUTPATIENT
Start: 2018-06-21 | End: 2018-06-21 | Stop reason: HOSPADM

## 2018-06-21 RX ORDER — BUPIVACAINE HYDROCHLORIDE AND EPINEPHRINE 2.5; 5 MG/ML; UG/ML
INJECTION, SOLUTION EPIDURAL; INFILTRATION; INTRACAUDAL; PERINEURAL
Status: DISCONTINUED | OUTPATIENT
Start: 2018-06-21 | End: 2018-06-21 | Stop reason: HOSPADM

## 2018-06-21 RX ORDER — CITRIC ACID/SODIUM CITRATE 334-500MG
SOLUTION, ORAL ORAL
Status: DISCONTINUED
Start: 2018-06-21 | End: 2018-06-21 | Stop reason: HOSPADM

## 2018-06-21 RX ORDER — MIDAZOLAM HYDROCHLORIDE 1 MG/ML
INJECTION INTRAMUSCULAR; INTRAVENOUS
Status: COMPLETED
Start: 2018-06-21 | End: 2018-06-21

## 2018-06-21 RX ORDER — SODIUM CHLORIDE, SODIUM LACTATE, POTASSIUM CHLORIDE, CALCIUM CHLORIDE 600; 310; 30; 20 MG/100ML; MG/100ML; MG/100ML; MG/100ML
INJECTION, SOLUTION INTRAVENOUS CONTINUOUS
Status: DISCONTINUED | OUTPATIENT
Start: 2018-06-21 | End: 2018-06-21 | Stop reason: HOSPADM

## 2018-06-21 RX ADMIN — ONDANSETRON HYDROCHLORIDE 4 MG: 2 INJECTION, SOLUTION INTRAMUSCULAR; INTRAVENOUS at 13:00

## 2018-06-21 RX ADMIN — LIDOCAINE HYDROCHLORIDE 0.5 ML: 10 INJECTION, SOLUTION INFILTRATION; PERINEURAL at 08:55

## 2018-06-21 RX ADMIN — SODIUM CHLORIDE, SODIUM LACTATE, POTASSIUM CHLORIDE, CALCIUM CHLORIDE: 600; 310; 30; 20 INJECTION, SOLUTION INTRAVENOUS at 08:55

## 2018-06-21 RX ADMIN — OXYCODONE HYDROCHLORIDE 10 MG: 5 SOLUTION ORAL at 12:45

## 2018-06-21 RX ADMIN — HYDROMORPHONE HYDROCHLORIDE 0.25 MG: 10 INJECTION, SOLUTION INTRAMUSCULAR; INTRAVENOUS; SUBCUTANEOUS at 12:50

## 2018-06-21 RX ADMIN — MIDAZOLAM 0.5 MG: 1 INJECTION INTRAMUSCULAR; INTRAVENOUS at 12:40

## 2018-06-21 ASSESSMENT — PAIN SCALES - WONG BAKER
WONGBAKER_NUMERICALRESPONSE: HURTS EVEN MORE
WONGBAKER_NUMERICALRESPONSE: HURTS A LITTLE MORE
WONGBAKER_NUMERICALRESPONSE: HURTS JUST A LITTLE BIT

## 2018-06-21 ASSESSMENT — PAIN SCALES - GENERAL
PAINLEVEL_OUTOF10: 5
PAINLEVEL_OUTOF10: 0

## 2018-06-21 NOTE — OP REPORT
DATE OF SERVICE:  06/21/2018    PREOPERATIVE DIAGNOSIS:  Left complex ovarian cyst, rule out ovarian cancer.    POSTOPERATIVE DIAGNOSES:  1.  Benign serous cystadenoma of the left ovary.  2.  Endometriosis.    PROCEDURES PERFORMED:  1.  Robotic-assisted bilateral salpingo-oophorectomy.  2.  Robotic-assisted left pelvic sidewall resection.    SURGEON:  Elliot Loza MD    ASSISTANT:  Regla Lamb, CST/FA    ANESTHESIA:  General.    ANESTHESIOLOGIST:  Lc Lloyd MD    ESTIMATED BLOOD LOSS:  10 mL.    FLUIDS:  Per Dr. Lloyd.    URINE OUTPUT:  As per Dr. Lloyd.    COMPLICATIONS:  None.    COUNTS:  Final sponge and needle counts correct.    INDICATIONS FOR SURGERY:  The patient is a pleasant 63-year-old female who was   referred to me by Dr. Lala because of a complex ovarian cyst.  Because of   her age, this was concerning for malignancy.  The patient was advised to   undergo surgical pathological evaluation.  Risks, benefits, and rationale of   the procedures were reviewed with the patient in detail.  The patient is   understanding of these risks and wished to proceed with the surgery as   planned.    INTRAOPERATIVE FINDINGS:  1.  There were some omental adhesions around the periumbilical area, which   require taking down.  Intraabdominally, there was approximately 20 mL of   cul-de-sac fluid upon entering.  The cyst appeared to be intact.  Right and   left diaphragm was smooth.  Liver capsule appeared grossly normal.  Stomach   was unremarkable.  Abdominal peritoneal surfaces were unremarkable.  2.  In the pelvis, uterus was absent.  There was an enlarged left ovary about   the size of a lemon.  The right adnexa was atrophic.  There was papillation   noted on the left pelvic sidewall.  There was no other endometriosis noted.    PROCEDURE NOTE:  The patient was given IV antibiotics prior to procedure.  The   patient was prepped and draped and placed in modified dorsal lithotomy   position.  We made a  small incision at the umbilicus.  The Veress needle was   introduced without difficulty.  Abdominal pressure was noted to be less than   5.  We insufflated the peritoneal cavity to abdominal pressure of 15 mmHg.    After completion of this, an 8 mm trocar was placed.  Following completion of   this, initial exploratory laparoscopy revealed some adhesions around the   periumbilical area.  There were no adhesions of the bowel.  I then placed   subsequent robotic ports in mid abdomen along the same plane as the umbilical   port.  Two robotic ports were place, 6 cm apart under direct laparoscopic   visualization while a fourth robotic port was placed in the left mid abdomen   along the plane of the umbilical port.  The adhesions were then subsequently   taken down.  The patient was then placed in steep Trendelenburg position.    Robotic system was then docked and we proceeded on with the bilateral   salpingo-oophorectomy.  Both the right and left posterior broad leaf ligament   was incised.  The ovarian vessels were skeletonized and isolated.  The ureters   were identified.  Bipolar cautery was used to cauterize the ovarian vessels   and subsequently divided.  I then completed the bilateral adnexectomy.  The   right pelvic sidewall peritoneum, which had papillation concerning for   possible seeding, was completely resected.  The entire left pelvic sidewall   was almost completely resected as it was containing the papillation.  This was   sent for frozen section.  Frozen section showed no malignancy.  The ovaries   were placed in the Endobag and decompressed it within the Endobag, the cyst   and subsequently delivered and sent for frozen section, which ultimately came   back as benign serous cystadenoma.  We did copiously irrigated pelvis with   water.  Hemostasis established.  Prior to excision of pelvic mass, we did   obtain pelvic washings and sent for cytology.  After completion of this, the   frozen section came  back as benign.  We did not proceed with surgical staging.    We counted for sponges, needles and instrument counts.  Once this was   counted for, robotic instrumentation was removed.  Robotic system was then   de-docked.  Pneumoperitoneum was allowed to escape through the 8 mm port.    Subcutaneous fat was copiously irrigated with water.  The skin was   reapproximated with 3-0 Monocryl sutures.    The patient tolerated the procedure well without any difficulties, was   subsequently transferred to the PACU in stable condition, extubated.       ____________________________________     CAROLA ZULETA MD    PCL / NTS    DD:  06/21/2018 12:30:02  DT:  06/21/2018 12:48:24    D#:  [id number]  Job#:  [id number]    cc: Barbara Lala MD, Lynn Buckley DO

## 2018-06-21 NOTE — DISCHARGE INSTRUCTIONS
ACTIVITY: Rest and take it easy for the first 24 hours.  A responsible adult is recommended to remain with you during that time.  It is normal to feel sleepy.  We encourage you to not do anything that requires balance, judgment or coordination.    MILD FLU-LIKE SYMPTOMS ARE NORMAL. YOU MAY EXPERIENCE GENERALIZED MUSCLE ACHES, THROAT IRRITATION, HEADACHE AND/OR SOME NAUSEA.    FOR 24 HOURS DO NOT:  Drive, operate machinery or run household appliances.  Drink beer or alcoholic beverages.   Make important decisions or sign legal documents.    SPECIAL INSTRUCTIONS:   1. No heavy lifting greater than 10 pounds for minimum 6 weeks  2. No driving while taking narcotics   3. Return to our office as directed and call to confirm appointment  Call our office [phone] if you develop any fevers, chills, nausea/vomiting, heavy vaginal bleeding, or redness, tenderness, and/or drainage from your wound, if you have persistent watery discharge while ambulating or stool draining from the vagina .  4. Showering is ok. After shower, make sure wound is dry.   5. You may keep the wound dressing and change everyday. After 2 weeks from surgery you may keep the wound dressing off.   6.  If you have not had a bowel movement for 2 days, please take over the counter Milk of Magnesium, 1 tablespoon every 4 hours. After 4 doses and if you still have not had a bowel movement, please call your doctor.   7. You may eat soft diet, such as soup, liquid, for day #1 and if tolerating you may resume your regular diet.      Bilateral Salpingo-Oophorectomy, Care After  Refer to this sheet in the next few weeks. These instructions provide you with information on caring for yourself after your procedure. Your health care provider may also give you more specific instructions. Your treatment has been planned according to current medical practices, but problems sometimes occur. Call your health care provider if you have any problems or questions after  your procedure.  WHAT TO EXPECT AFTER THE PROCEDURE  After your procedure, it is typical to have the following:   · Abdominal pain that can be controlled with medicine.  · Vaginal spotting.  · Constipation.  · Menopausal symptoms such as hot flashes, vaginal dryness, and mood swings.  HOME CARE INSTRUCTIONS   · Get plenty of rest and sleep.  · Only take over-the-counter or prescription medicines as directed by your health care provider. Do not take aspirin. It can cause bleeding.  · Keep incision areas clean and dry. Remove or change bandages (dressings) only as directed by your health care provider.  · Take showers instead of baths for a few weeks as directed by your health care provider.  · Limit exercise and activities as directed by your health care provider. Do not lift anything heavier than 5 pounds (2.3 kg) until your health care provider approves.  · Do not drive until your health care provider approves.  · Follow your health care provider's advice regarding diet. You may be able to resume your usual diet right away.  · Drink enough fluids to keep your urine clear or pale yellow.  · Do not douche, use tampons, or have sexual intercourse for 6 weeks after the procedure.  · Do not drink alcohol until your health care provider says it is okay.  · Take your temperature twice a day and write it down.  · If you become constipated, you may:  ¨ Ask your health care provider about taking a mild laxative.  ¨ Add more fruit and bran to your diet.  ¨ Drink more fluids.  · Follow up with your health care provider as directed.  SEEK MEDICAL CARE IF:   · You have swelling, redness, or increasing pain in the incision area.  · You see pus coming from the incision area.  · You notice a bad smell coming from the wound or dressing.  · You have pain, redness, or swelling where the IV access tube was placed.  · Your incision is breaking open (the edges are not staying together).  · You feel dizzy or feel like fainting.  · You  develop pain or bleeding when you urinate.  · You develop diarrhea.  · You develop nausea and vomiting.  · You develop abnormal vaginal discharge.  · You develop a rash.  · You have pain that is not controlled with medicine.  SEEK IMMEDIATE MEDICAL CARE IF:   · You develop a fever.  · You develop abdominal pain.  · You have chest pain.  · You develop shortness of breath.  · You pass out.  · You develop pain, swelling, or redness in your leg.  · You develop heavy vaginal bleeding with or without blood clots.      DIET: To avoid nausea, slowly advance diet as tolerated, avoiding spicy or greasy foods for the first day.  Add more substantial food to your diet according to your physician's instructions.  INCREASE FLUIDS AND FIBER TO AVOID CONSTIPATION.    SURGICAL DRESSING/BATHING: You may keep the wound dressing and change everyday. After 2 weeks from surgery you may keep the wound dressing off    FOLLOW-UP APPOINTMENT:  A follow-up appointment should be arranged with your doctor in 1-2 weeks; call to schedule.    You should CALL YOUR PHYSICIAN if you develop:  Fever greater than 101 degrees F.  Pain not relieved by medication, or persistent nausea or vomiting.  Excessive bleeding (blood soaking through dressing) or unexpected drainage from the wound.  Extreme redness or swelling around the incision site, drainage of pus or foul smelling drainage.  Inability to urinate or empty your bladder within 8 hours.  Problems with breathing or chest pain.    You should call 911 if you develop problems with breathing or chest pain.  If you are unable to contact your doctor or surgical center, you should go to the nearest emergency room or urgent care center.  Physician's telephone #: Dr. Loza ([phone])    If any questions arise, call your doctor.  If your doctor is not available, please feel free to call the Surgical Center at [phone].  The Center is open Monday through Friday from 7AM to 7PM.  You can also call the  HEALTH HOTLINE open 24 hours/day, 7 days/week and speak to a nurse at [phone], or toll free at [phone].    A registered nurse may call you a few days after your surgery to see how you are doing after your procedure.    MEDICATIONS: Resume taking daily medication.  Take prescribed pain medication with food.  If no medication is prescribed, you may take non-aspirin pain medication if needed.  PAIN MEDICATION CAN BE VERY CONSTIPATING.  Take a stool softener or laxative such as senokot, pericolace, or milk of magnesia if needed.    Prescription at home.  Last pain medication given at 12:45pm.    If your physician has prescribed pain medication that includes Acetaminophen (Tylenol), do not take additional Acetaminophen (Tylenol) while taking the prescribed medication.    Depression / Suicide Risk    As you are discharged from this [facility] facility, it is important to learn how to keep safe from harming yourself.    Recognize the warning signs:  · Abrupt changes in personality, positive or negative- including increase in energy   · Giving away possessions  · Change in eating patterns- significant weight changes-  positive or negative  · Change in sleeping patterns- unable to sleep or sleeping all the time   · Unwillingness or inability to communicate  · Depression  · Unusual sadness, discouragement and loneliness  · Talk of wanting to die  · Neglect of personal appearance   · Rebelliousness- reckless behavior  · Withdrawal from people/activities they love  · Confusion- inability to concentrate     If you or a loved one observes any of these behaviors or has concerns about self-harm, here's what you can do:  · Talk about it- your feelings and reasons for harming yourself  · Remove any means that you might use to hurt yourself (examples: pills, rope, extension cords, firearm)  · Get professional help from the community (Mental Health, Substance Abuse, psychological counseling)  · Do not be alone:Call  your Safe Contact- someone whom you trust who will be there for you.  · Call your local CRISIS HOTLINE 034-8022 or 176-889-0023  · Call your local Children's Mobile Crisis Response Team [place] (059) 817-6540 or www.[employer]  · Call the toll free National Suicide Prevention Hotlines   · National Suicide Prevention Lifeline 719-219-URVM (6866)  · National [employer] Line Network 800-SUICIDE (113-1737)

## 2018-06-21 NOTE — OR SURGEON
Immediate Post OP Note    PreOp Diagnosis: left complex ovarian cyst    PostOp Diagnosis: benign serous cystadenoma of the left ovary    Procedure(s):  LAPAROSCOPY ROBOTIC XI - Wound Class: Clean Contaminated  SALPINGECTOMY - Wound Class: Clean Contaminated  OOPHORECTOMY - Wound Class: Clean Contaminated    Surgeon(s):  Elliot Loza M.D.    Anesthesiologist/Type of Anesthesia:  Anesthesiologist: Oliver Lloyd III, M.D./General    Surgical Staff:  Assistant: Regla Lamb R.N.  Circulator: Sergio Cavanaugh R.N.; Lynn Jaramillo R.N.  Relief Circulator: Ubaldo Grace R.N.  Scrub Person: Ban Mosley; Susan Link    Specimens removed if any: bilateral tubes and ovaries and left pelvic side wall    Estimated Blood Loss: 10cc    Findings: left pelvic side wall with papillation    Complications: none        6/21/2018 12:22 PM Elliot Loza M.D.

## 2018-06-26 ENCOUNTER — HOSPITAL ENCOUNTER (INPATIENT)
Facility: MEDICAL CENTER | Age: 63
LOS: 1 days | DRG: 483 | End: 2018-06-27
Attending: ORTHOPAEDIC SURGERY | Admitting: ORTHOPAEDIC SURGERY
Payer: MEDICARE

## 2018-06-26 ENCOUNTER — APPOINTMENT (OUTPATIENT)
Dept: RADIOLOGY | Facility: MEDICAL CENTER | Age: 63
DRG: 483 | End: 2018-06-26
Attending: PHYSICIAN ASSISTANT
Payer: MEDICARE

## 2018-06-26 PROCEDURE — G8987 SELF CARE CURRENT STATUS: HCPCS | Mod: CJ

## 2018-06-26 PROCEDURE — 73020 X-RAY EXAM OF SHOULDER: CPT | Mod: LT

## 2018-06-26 PROCEDURE — A9270 NON-COVERED ITEM OR SERVICE: HCPCS | Performed by: ORTHOPAEDIC SURGERY

## 2018-06-26 PROCEDURE — 94760 N-INVAS EAR/PLS OXIMETRY 1: CPT

## 2018-06-26 PROCEDURE — 501838 HCHG SUTURE GENERAL: Performed by: ORTHOPAEDIC SURGERY

## 2018-06-26 PROCEDURE — 160009 HCHG ANES TIME/MIN: Performed by: ORTHOPAEDIC SURGERY

## 2018-06-26 PROCEDURE — 700101 HCHG RX REV CODE 250

## 2018-06-26 PROCEDURE — 770006 HCHG ROOM/CARE - MED/SURG/GYN SEMI*

## 2018-06-26 PROCEDURE — 700111 HCHG RX REV CODE 636 W/ 250 OVERRIDE (IP)

## 2018-06-26 PROCEDURE — 97165 OT EVAL LOW COMPLEX 30 MIN: CPT

## 2018-06-26 PROCEDURE — 160048 HCHG OR STATISTICAL LEVEL 1-5: Performed by: ORTHOPAEDIC SURGERY

## 2018-06-26 PROCEDURE — G8988 SELF CARE GOAL STATUS: HCPCS | Mod: CJ

## 2018-06-26 PROCEDURE — 700102 HCHG RX REV CODE 250 W/ 637 OVERRIDE(OP): Performed by: ORTHOPAEDIC SURGERY

## 2018-06-26 PROCEDURE — 160022 HCHG BLOCK: Performed by: ORTHOPAEDIC SURGERY

## 2018-06-26 PROCEDURE — 700101 HCHG RX REV CODE 250: Performed by: PHYSICIAN ASSISTANT

## 2018-06-26 PROCEDURE — 502000 HCHG MISC OR IMPLANTS RC 0278: Performed by: ORTHOPAEDIC SURGERY

## 2018-06-26 PROCEDURE — 160036 HCHG PACU - EA ADDL 30 MINS PHASE I: Performed by: ORTHOPAEDIC SURGERY

## 2018-06-26 PROCEDURE — A9270 NON-COVERED ITEM OR SERVICE: HCPCS

## 2018-06-26 PROCEDURE — 700102 HCHG RX REV CODE 250 W/ 637 OVERRIDE(OP)

## 2018-06-26 PROCEDURE — 700105 HCHG RX REV CODE 258: Performed by: PHYSICIAN ASSISTANT

## 2018-06-26 PROCEDURE — 97535 SELF CARE MNGMENT TRAINING: CPT

## 2018-06-26 PROCEDURE — 160029 HCHG SURGERY MINUTES - 1ST 30 MINS LEVEL 4: Performed by: ORTHOPAEDIC SURGERY

## 2018-06-26 PROCEDURE — 160002 HCHG RECOVERY MINUTES (STAT): Performed by: ORTHOPAEDIC SURGERY

## 2018-06-26 PROCEDURE — 700102 HCHG RX REV CODE 250 W/ 637 OVERRIDE(OP): Performed by: PHYSICIAN ASSISTANT

## 2018-06-26 PROCEDURE — 0LS40ZZ REPOSITION LEFT UPPER ARM TENDON, OPEN APPROACH: ICD-10-PCS | Performed by: ORTHOPAEDIC SURGERY

## 2018-06-26 PROCEDURE — A9270 NON-COVERED ITEM OR SERVICE: HCPCS | Performed by: PHYSICIAN ASSISTANT

## 2018-06-26 PROCEDURE — 160041 HCHG SURGERY MINUTES - EA ADDL 1 MIN LEVEL 4: Performed by: ORTHOPAEDIC SURGERY

## 2018-06-26 PROCEDURE — L8699 PROSTHETIC IMPLANT NOS: HCPCS | Performed by: ORTHOPAEDIC SURGERY

## 2018-06-26 PROCEDURE — 700112 HCHG RX REV CODE 229: Performed by: PHYSICIAN ASSISTANT

## 2018-06-26 PROCEDURE — 700111 HCHG RX REV CODE 636 W/ 250 OVERRIDE (IP): Performed by: PHYSICIAN ASSISTANT

## 2018-06-26 PROCEDURE — 160035 HCHG PACU - 1ST 60 MINS PHASE I: Performed by: ORTHOPAEDIC SURGERY

## 2018-06-26 PROCEDURE — 502578 HCHG PACK, TOTAL HIP: Performed by: ORTHOPAEDIC SURGERY

## 2018-06-26 PROCEDURE — 0RRK0JZ REPLACEMENT OF LEFT SHOULDER JOINT WITH SYNTHETIC SUBSTITUTE, OPEN APPROACH: ICD-10-PCS | Performed by: ORTHOPAEDIC SURGERY

## 2018-06-26 DEVICE — IMPLANTABLE DEVICE: Type: IMPLANTABLE DEVICE | Site: SHOULDER | Status: FUNCTIONAL

## 2018-06-26 DEVICE — BONE CEMENT SIMPLEX ANTIBIO - (10/PK): Type: IMPLANTABLE DEVICE | Site: SHOULDER | Status: FUNCTIONAL

## 2018-06-26 RX ORDER — SODIUM CHLORIDE, SODIUM LACTATE, POTASSIUM CHLORIDE, CALCIUM CHLORIDE 600; 310; 30; 20 MG/100ML; MG/100ML; MG/100ML; MG/100ML
1000 INJECTION, SOLUTION INTRAVENOUS
Status: COMPLETED | OUTPATIENT
Start: 2018-06-26 | End: 2018-06-26

## 2018-06-26 RX ORDER — ONDANSETRON 2 MG/ML
4 INJECTION INTRAMUSCULAR; INTRAVENOUS EVERY 4 HOURS PRN
Status: DISCONTINUED | OUTPATIENT
Start: 2018-06-26 | End: 2018-06-27 | Stop reason: HOSPADM

## 2018-06-26 RX ORDER — BUPIVACAINE HYDROCHLORIDE 5 MG/ML
INJECTION, SOLUTION EPIDURAL; INTRACAUDAL
Status: DISPENSED
Start: 2018-06-26 | End: 2018-06-26

## 2018-06-26 RX ORDER — PANTOPRAZOLE SODIUM 40 MG/1
40 TABLET, DELAYED RELEASE ORAL 2 TIMES DAILY
Status: DISCONTINUED | OUTPATIENT
Start: 2018-06-26 | End: 2018-06-26

## 2018-06-26 RX ORDER — ENEMA 19; 7 G/133ML; G/133ML
1 ENEMA RECTAL
Status: DISCONTINUED | OUTPATIENT
Start: 2018-06-26 | End: 2018-06-27 | Stop reason: HOSPADM

## 2018-06-26 RX ORDER — ALUMINA, MAGNESIA, AND SIMETHICONE 2400; 2400; 240 MG/30ML; MG/30ML; MG/30ML
10 SUSPENSION ORAL 4 TIMES DAILY PRN
Status: DISCONTINUED | OUTPATIENT
Start: 2018-06-26 | End: 2018-06-27 | Stop reason: HOSPADM

## 2018-06-26 RX ORDER — DIPHENHYDRAMINE HYDROCHLORIDE 50 MG/ML
25 INJECTION INTRAMUSCULAR; INTRAVENOUS EVERY 6 HOURS PRN
Status: DISCONTINUED | OUTPATIENT
Start: 2018-06-26 | End: 2018-06-27 | Stop reason: HOSPADM

## 2018-06-26 RX ORDER — CHLORPROMAZINE HYDROCHLORIDE 25 MG/ML
25 INJECTION INTRAMUSCULAR EVERY 6 HOURS PRN
Status: DISCONTINUED | OUTPATIENT
Start: 2018-06-26 | End: 2018-06-27 | Stop reason: HOSPADM

## 2018-06-26 RX ORDER — DIPHENHYDRAMINE HCL 25 MG
25 TABLET ORAL EVERY 6 HOURS PRN
Status: DISCONTINUED | OUTPATIENT
Start: 2018-06-26 | End: 2018-06-27 | Stop reason: HOSPADM

## 2018-06-26 RX ORDER — CHLORPROMAZINE HYDROCHLORIDE 25 MG/1
25 TABLET, FILM COATED ORAL EVERY 6 HOURS PRN
Status: DISCONTINUED | OUTPATIENT
Start: 2018-06-26 | End: 2018-06-27 | Stop reason: HOSPADM

## 2018-06-26 RX ORDER — BISACODYL 10 MG
10 SUPPOSITORY, RECTAL RECTAL
Status: DISCONTINUED | OUTPATIENT
Start: 2018-06-26 | End: 2018-06-27 | Stop reason: HOSPADM

## 2018-06-26 RX ORDER — AMOXICILLIN 250 MG
1 CAPSULE ORAL NIGHTLY
Status: DISCONTINUED | OUTPATIENT
Start: 2018-06-26 | End: 2018-06-27 | Stop reason: HOSPADM

## 2018-06-26 RX ORDER — SCOLOPAMINE TRANSDERMAL SYSTEM 1 MG/1
1 PATCH, EXTENDED RELEASE TRANSDERMAL
Status: DISCONTINUED | OUTPATIENT
Start: 2018-06-26 | End: 2018-06-27 | Stop reason: HOSPADM

## 2018-06-26 RX ORDER — OMEPRAZOLE 20 MG/1
20 CAPSULE, DELAYED RELEASE ORAL 2 TIMES DAILY
Status: DISCONTINUED | OUTPATIENT
Start: 2018-06-26 | End: 2018-06-27 | Stop reason: HOSPADM

## 2018-06-26 RX ORDER — DOCUSATE SODIUM 100 MG/1
100 CAPSULE, LIQUID FILLED ORAL 2 TIMES DAILY
Status: DISCONTINUED | OUTPATIENT
Start: 2018-06-26 | End: 2018-06-27 | Stop reason: HOSPADM

## 2018-06-26 RX ORDER — AMOXICILLIN 250 MG
1 CAPSULE ORAL
Status: DISCONTINUED | OUTPATIENT
Start: 2018-06-26 | End: 2018-06-27 | Stop reason: HOSPADM

## 2018-06-26 RX ORDER — DEXAMETHASONE SODIUM PHOSPHATE 4 MG/ML
4 INJECTION, SOLUTION INTRA-ARTICULAR; INTRALESIONAL; INTRAMUSCULAR; INTRAVENOUS; SOFT TISSUE
Status: DISCONTINUED | OUTPATIENT
Start: 2018-06-26 | End: 2018-06-27 | Stop reason: HOSPADM

## 2018-06-26 RX ORDER — DEXTROSE MONOHYDRATE, SODIUM CHLORIDE, AND POTASSIUM CHLORIDE 50; 1.49; 4.5 G/1000ML; G/1000ML; G/1000ML
INJECTION, SOLUTION INTRAVENOUS CONTINUOUS
Status: DISCONTINUED | OUTPATIENT
Start: 2018-06-26 | End: 2018-06-27 | Stop reason: HOSPADM

## 2018-06-26 RX ORDER — OXYCODONE AND ACETAMINOPHEN 10; 325 MG/1; MG/1
1 TABLET ORAL EVERY 4 HOURS
Status: DISCONTINUED | OUTPATIENT
Start: 2018-06-26 | End: 2018-06-27

## 2018-06-26 RX ORDER — MORPHINE SULFATE 4 MG/ML
4 INJECTION, SOLUTION INTRAMUSCULAR; INTRAVENOUS
Status: DISCONTINUED | OUTPATIENT
Start: 2018-06-26 | End: 2018-06-27 | Stop reason: HOSPADM

## 2018-06-26 RX ORDER — POLYETHYLENE GLYCOL 3350 17 G/17G
1 POWDER, FOR SOLUTION ORAL 2 TIMES DAILY PRN
Status: DISCONTINUED | OUTPATIENT
Start: 2018-06-26 | End: 2018-06-27 | Stop reason: HOSPADM

## 2018-06-26 RX ORDER — LIDOCAINE HYDROCHLORIDE 10 MG/ML
INJECTION, SOLUTION EPIDURAL; INFILTRATION; INTRACAUDAL; PERINEURAL
Status: COMPLETED
Start: 2018-06-26 | End: 2018-06-26

## 2018-06-26 RX ORDER — HALOPERIDOL 5 MG/ML
1 INJECTION INTRAMUSCULAR EVERY 6 HOURS PRN
Status: DISCONTINUED | OUTPATIENT
Start: 2018-06-26 | End: 2018-06-27 | Stop reason: HOSPADM

## 2018-06-26 RX ADMIN — FENTANYL CITRATE 50 MCG: 50 INJECTION, SOLUTION INTRAMUSCULAR; INTRAVENOUS at 10:23

## 2018-06-26 RX ADMIN — OXYCODONE HYDROCHLORIDE AND ACETAMINOPHEN 1 TABLET: 10; 325 TABLET ORAL at 15:42

## 2018-06-26 RX ADMIN — HYDROCODONE BITARTRATE AND ACETAMINOPHEN 30 ML: 7.5; 325 SOLUTION ORAL at 10:21

## 2018-06-26 RX ADMIN — CEFAZOLIN 1 G: 1 INJECTION, POWDER, FOR SOLUTION INTRAMUSCULAR; INTRAVENOUS at 15:43

## 2018-06-26 RX ADMIN — LIDOCAINE HYDROCHLORIDE: 10 INJECTION, SOLUTION EPIDURAL; INFILTRATION; INTRACAUDAL; PERINEURAL at 07:30

## 2018-06-26 RX ADMIN — OXYCODONE HYDROCHLORIDE AND ACETAMINOPHEN 1 TABLET: 10; 325 TABLET ORAL at 20:36

## 2018-06-26 RX ADMIN — DOCUSATE SODIUM 100 MG: 100 CAPSULE, LIQUID FILLED ORAL at 20:36

## 2018-06-26 RX ADMIN — OMEPRAZOLE 20 MG: 20 CAPSULE, DELAYED RELEASE ORAL at 20:36

## 2018-06-26 RX ADMIN — POTASSIUM CHLORIDE, DEXTROSE MONOHYDRATE AND SODIUM CHLORIDE: 150; 5; 450 INJECTION, SOLUTION INTRAVENOUS at 13:44

## 2018-06-26 RX ADMIN — ALUMINUM HYDROXIDE, MAGNESIUM HYDROXIDE, AND DIMETHICONE 10 ML: 400; 400; 40 SUSPENSION ORAL at 17:56

## 2018-06-26 RX ADMIN — FENTANYL CITRATE 50 MCG: 50 INJECTION, SOLUTION INTRAMUSCULAR; INTRAVENOUS at 10:32

## 2018-06-26 RX ADMIN — SENNOSIDES AND DOCUSATE SODIUM 1 TABLET: 8.6; 5 TABLET ORAL at 20:36

## 2018-06-26 RX ADMIN — SODIUM CHLORIDE, SODIUM LACTATE, POTASSIUM CHLORIDE, CALCIUM CHLORIDE 1000 ML: 600; 310; 30; 20 INJECTION, SOLUTION INTRAVENOUS at 07:35

## 2018-06-26 ASSESSMENT — PAIN SCALES - GENERAL
PAINLEVEL_OUTOF10: 4
PAINLEVEL_OUTOF10: 3
PAINLEVEL_OUTOF10: 2
PAINLEVEL_OUTOF10: 8
PAINLEVEL_OUTOF10: 3

## 2018-06-26 ASSESSMENT — ACTIVITIES OF DAILY LIVING (ADL): TOILETING: INDEPENDENT

## 2018-06-26 NOTE — PROGRESS NOTES
Pt arrived on [facility], no signs of distress. Pt denies pain at this time positive for numbness on left thumb. LUE on sling and immobilizer in place. POC discussed safety measures in place.

## 2018-06-26 NOTE — PROGRESS NOTES
"Pt complaining of \" burning\" in her throat and sates it is from her \" indigestions\". Pt states she usually takes Maalox at home. Pt also requesting to be discharged today. Pt denies pain, denies nausea and has ambulated in the hallway. CMS intact. Pt educated on pain that may arise after neuromuscular block wears off. Pt states \" I can take care of myself better at home\" pt's  at bedside.  Pt asked if there are any immediate needs at this time. Pt declines.pt also complaining of immobilizer. Immobilizer adjusted. Per pt request.  Pt appears anxious at this time.  reassurance provided. Pt adamant about going home today.    [phone] Dr. Donovan paged.   "

## 2018-06-26 NOTE — CARE PLAN
Problem: Venous Thromboembolism (VTW)/Deep Vein Thrombosis (DVT) Prevention:  Goal: Patient will participate in Venous Thrombosis (VTE)/Deep Vein Thrombosis (DVT)Prevention Measures   06/26/18 1200   Mechanical/VTE Prophylaxis   Mechanical Prophylaxis  SCDs, Sequential Compression Device   SCDs, Sequential Compression Device On       Problem: Psychosocial Needs:  Goal: Level of anxiety will decrease  Pt anxious at this time. Pt given reassurance, POC discussed. Questions were addressed.

## 2018-06-26 NOTE — OR NURSING
1011: To PACU post left total shoulder arthroplasty w/ interscalene block. Pt is extubated, breathing is spontaneous and unlabored. States having mild pain, no nausea.  1018: Pt is moaning, states pain is 8/10, see MAR.  1045: Pt states pain is now 2-3/10.  1105: Meets criteria for transfer to room.

## 2018-06-26 NOTE — PROGRESS NOTES
Spoke to BILL zavala. PA made aware of patient requesting Maalox. Orders received. PA also made aware pt complaining of numbness and tingling on her left hand and that pt states that she can't sleep because of the sensation. Per lucas, numbness and tingling is part of the neuromuscular block. CMS remains intact, fingers are warm and pink, pt is able to move them. Pt educated. Orders received for discharge home today or stay overnight, per pt request.

## 2018-06-27 VITALS
BODY MASS INDEX: 19.69 KG/M2 | WEIGHT: 111.11 LBS | RESPIRATION RATE: 18 BRPM | HEIGHT: 63 IN | HEART RATE: 72 BPM | OXYGEN SATURATION: 98 % | TEMPERATURE: 98.2 F | SYSTOLIC BLOOD PRESSURE: 109 MMHG | DIASTOLIC BLOOD PRESSURE: 64 MMHG

## 2018-06-27 PROCEDURE — G8988 SELF CARE GOAL STATUS: HCPCS | Mod: CJ

## 2018-06-27 PROCEDURE — A9270 NON-COVERED ITEM OR SERVICE: HCPCS | Performed by: PHYSICIAN ASSISTANT

## 2018-06-27 PROCEDURE — 700111 HCHG RX REV CODE 636 W/ 250 OVERRIDE (IP): Performed by: PHYSICIAN ASSISTANT

## 2018-06-27 PROCEDURE — 97535 SELF CARE MNGMENT TRAINING: CPT

## 2018-06-27 PROCEDURE — G8989 SELF CARE D/C STATUS: HCPCS | Mod: CJ

## 2018-06-27 PROCEDURE — 700105 HCHG RX REV CODE 258: Performed by: PHYSICIAN ASSISTANT

## 2018-06-27 PROCEDURE — 700102 HCHG RX REV CODE 250 W/ 637 OVERRIDE(OP): Performed by: PHYSICIAN ASSISTANT

## 2018-06-27 RX ORDER — OXYCODONE AND ACETAMINOPHEN 10; 325 MG/1; MG/1
2 TABLET ORAL EVERY 4 HOURS
Status: DISCONTINUED | OUTPATIENT
Start: 2018-06-27 | End: 2018-06-27 | Stop reason: HOSPADM

## 2018-06-27 RX ADMIN — OXYCODONE HYDROCHLORIDE AND ACETAMINOPHEN 1 TABLET: 10; 325 TABLET ORAL at 00:28

## 2018-06-27 RX ADMIN — CEFAZOLIN 1 G: 1 INJECTION, POWDER, FOR SOLUTION INTRAMUSCULAR; INTRAVENOUS at 00:28

## 2018-06-27 RX ADMIN — OXYCODONE HYDROCHLORIDE AND ACETAMINOPHEN 2 TABLET: 10; 325 TABLET ORAL at 07:53

## 2018-06-27 RX ADMIN — OXYCODONE HYDROCHLORIDE AND ACETAMINOPHEN 1 TABLET: 10; 325 TABLET ORAL at 04:10

## 2018-06-27 RX ADMIN — MORPHINE SULFATE 4 MG: 4 INJECTION INTRAVENOUS at 03:54

## 2018-06-27 ASSESSMENT — PAIN SCALES - GENERAL
PAINLEVEL_OUTOF10: 4
PAINLEVEL_OUTOF10: 9
PAINLEVEL_OUTOF10: 5
PAINLEVEL_OUTOF10: 7
PAINLEVEL_OUTOF10: 8

## 2018-06-27 NOTE — THERAPY
"Occupational Therapy Evaluation completed.   Functional Status:  Asked by RN to see pt STAT for possible d/c home this evening. Upon encountering pt, she has decided she's like to stay overnight so that when the block wears off she has assistance from nursing in getting control of the pain.  Pt was able to get up OOB and walk to bathroom by herself, even managing IV pole.  OT assisted pt at EOB to readjust brace for proper positioning and comfort.  Pt educated that elbow needs to be back in pocket of immobilizer and that hand and wrist should not be hanging out of the brace.  Pt positioned back in bed with pillow lengthwise to support humerus, and pillow on her lap to support forearm and lessen the tension of the strap around her neck while she is at rest.  Large ice pack made, placed in pillowcase and draped over L shoulder.  Assured pt that numbness in her fingers was normal from the nerve block, and that it would help to have her wrist and proximal hand fully supported in the brace.  Pt also anxious about the color of her L arm, and OT educated pt that this was simply the color of the antiseptic used to clean her arm for surgery.  Pt appears comfortable and more reassured at this time.  OT will return in am to see pt again for UB dressing and bathing education, and to make sure she can manage brace on her own.  Plan of Care: Will benefit from Occupational Therapy 2 times per week  Discharge Recommendations:  Equipment: No Equipment Needed. Post-acute therapy Currently anticipate no further skilled therapy needs once patient is discharged from the inpatient setting.    See \"Rehab Therapy-Acute\" Patient Summary Report for complete documentation.    "

## 2018-06-27 NOTE — OP REPORT
DATE OF SERVICE:  06/26/2018    PREOPERATIVE DIAGNOSES:  Left shoulder glenohumeral degenerative joint disease   and proximal biceps pathology.    POSTOPERATIVE DIAGNOSES:  Left shoulder glenohumeral degenerative joint   disease and proximal biceps pathology.    PROCEDURES PERFORMED:  1.  Left total shoulder arthroplasty.  2.  Left proximal biceps tenodesis.    SURGEON:  Eulalio Donovan MD    ASSISTANT:  Christin Ching PA-C    ANESTHESIA:  General and an interscalene nerve block.    ANESTHESIOLOGIST:  Dr. Hayes.    IMPLANTS:  Tornier Aequalis Ascend flex size 4A ingrowth humeral stem, a 46x17   mm low offset humeral head and a small 35-degree keeled glenoid.    COMPLICATIONS:  None.    DISPOSITION:  Stable to postanesthesia care unit.    INDICATIONS:  The patient has had progressive left shoulder pain, which has   been unresponsive to conservative management.  The risks, benefits,   alternatives, and limitations of surgical intervention were discussed in   detail.  She expressed understanding and desired to proceed.    DESCRIPTION OF PROCEDURE:  The patient and the correct operative extremity   were identified in the preoperative area.  The shoulder was marked.  She was   brought to the operating room where the correct operative extremity was again   confirmed.  She was placed supine on the OR table where she underwent an   interscalene nerve block performed at my request for postoperative pain   control.  She underwent general anesthesia without complication.  Examination   under anesthesia showed limited range of motion in all planes.  Shoulder was   prepped with alcohol.  This was allowed to dry.  The shoulder was then prepped   and draped in the usual sterile fashion using ChloraPrep.  A 10 cm   longitudinal incision was then centered over the deltopectoral interval.    Blunt dissection carried down to level of the deltoid fascia.  The   deltopectoral interval was then developed.  The cephalic vein was  identified   and kept on the deltoid.  A retractor was placed beneath the conjoined in the   deltoid.  The superior 1 cm of the pectoralis was released.  The biceps was   tenodesed to the pectoralis stump.  The subscap tenotomy was then performed   and the subscapularis tagged for later repair.  Osteophytes were removed.  A   humeral head osteotomy was performed.  The canal was then sounded and then   broached up to a size 4A ingrowth humeral stem.  A humeral head protector was   placed.  A retractor was then placed and the glenoid exposed, biceps stump and   hypertrophic labrum were excised.  A centering drill was placed at the center   of the glenoid.  It was reamed for a small 35-degree keeled glenoid.  The   offset drill guide was placed and offset drill holes placed.  The intervening   bone was removed and the glenoid was then punched.  We placed the trial, it   fit flush, then vacuum irrigated and dried the bony surfaces while the cement   was vacuum mixed.  We then injected the cement within the glenoid vault 3   times impacting that each time and then impacted the real keeled glenoid,   removed all pressure while the cement cured completely while the shoulder was   bathing in a Betadine solution and then copiously irrigated the wound,   dislocated the proximal humerus, placed the trial head, reduced the humerus,   took the shoulder through a full range of motion, had excellent range of   motion and excellent stability.  I then removed the trial, placed five #2   Ultrabraids to drill tunnels in the lesser tuberosity, set the back table,   then impacted within the metaphysis after bone graft and the metaphysis to   bone from the humeral head.  I again took the shoulder through a full range of   motion and had excellent range of motion and excellent stability.  Again,   irrigated the wounds, closed the subscap and rotator interval with interrupted   #1 Vicryl suture, oversewed the subscap repair with the #2  Ultrabraids and   drilled tunnels in the lesser tuberosity and again irrigated the wound, closed   the deltopectoral interval with interrupted #1 Vicryl suture, closed the deep   subcutaneous with 2-0 Vicryl, closed the skin with staples.  Sterile   dressings were applied.  The patient was placed into an UltraSling, allowed to   awake from anesthesia, transferred to her hospital cart, taken to   postanesthesia care unit in stable condition.  She tolerated the procedure   well.  There were no immediate complications.       ____________________________________     JULIAN JERONIMO MD RD / [place]    DD:  06/26/2018 16:52:06  DT:  06/26/2018 17:33:44    D#:  [id number]  Job#:  [id number]

## 2018-06-27 NOTE — PROGRESS NOTES
"Total Joint Replacement Program Rounding     Inpatient bedside rounding completed to address quality of care provided by total joint replacement program IDT and overall patient experience at [facility].    Patient Satisfaction addressed. Patient/family updated on POC during hospital stay.  No further questions/concerns at this time. Please see \"MyRounding\" for further details of rounding discussion. RN updated.     Pt completed mupirocin treatment x 5 days as instructed prior to surgery. She denies having received any preop education or information on the online video.   "

## 2018-06-27 NOTE — THERAPY
Occupational Therapy Treatment completed with focus on ADLs, ADL transfers, patient education and caregiver training.  Functional Status:  Pt seated EOB, awaiting therapy. Has been walking indep in room and to bathroom all t/o the night.  Worked with pt and spouse at bedside demonstrating UB dressing, brace removal and application for hygiene and clothing changes.  Pt understands positioning with pillows in bed, and that no ROM to shoulder is to be done.  Educated on positioning for shower, but pt reports she is going to take a sponge bath for a couple weeks go to a recent GYN surgery.    Pt and spouse educated in detail on ADL's after Total Shoulder surgery.   Patient specifically educated on surgeon's post-operative precautions including NO shoulder ROM or pendulum exercises until cleared by physician.    Summary of education completed:  How to adjust sling/immobilizer for best fit.  · To keep sling positioned so hand is level or slightly above elbow to reduce pull of gravity on arm and maintain shoulder in neutral positioning.  How to don & doff sling/immobilizer safely and appropriately for dressing and bathing.  How to dress upper body while maintaining post surgical precautions.  How to shower safely.  · How to appropriately cover incision for showering if needed prior to removal of staples.    · Proper positioning of arm during showering.   · Encouraged use of hand held shower (using the non-operative extremity) to keep water away from the incision site.    · Safe shower and toilet transfers.  Proper positioning while sleeping either in bed or recliner  · Encouraged patient to support arm with pillows under forearm when sitting to reduce strain on the neck from the weight of the arm and immobilizer.   Proper bed mobility and transfer techniques while maintaining NWB on surgical arm.  Proper positioning of arm for gentle wrist/hand ROM and passive elbow extension.    Pain management education - around the  "clock.    Pt and spouse verbalized and demonstrated understanding of education provided.    Plan of Care: Patient with no further skilled OT needs in the acute care setting at this time  Discharge Recommendations:  Equipment No Equipment Needed. Post-acute therapy Discharge to home with outpatient or home health for additional skilled therapy services.    See \"Rehab Therapy-Acute\" Patient Summary Report for complete documentation.   "

## 2018-06-27 NOTE — DISCHARGE INSTRUCTIONS
Keep dressing on until first post op visit.      Do not soak or submerge incisions for three weeks. Ice and elevate extremity.  Follow up 10-14 days.    Immobilizer on at all time except bathing.    Patient has post op pain medications.    Discharge Instructions    Discharged to home by car with relative. Discharged via wheelchair, hospital escort: Yes.  Special equipment needed: Immobilizer    Be sure to schedule a follow-up appointment with your primary care doctor or any specialists as instructed.     Discharge Plan:        I understand that a diet low in cholesterol, fat, and sodium is recommended for good health. Unless I have been given specific instructions below for another diet, I accept this instruction as my diet prescription.   Other diet: Regular    Special Instructions: Discharge instructions for the Orthopedic Patient    Follow up with Primary Care Physician within 2 weeks of discharge to home, regarding:  Review of medications and diagnostic testing.  Surveillance for medical complications.  Workup and treatment of osteoporosis, if appropriate.     -Is this a Joint Replacement patient? No    -Is this patient being discharged with medication to prevent blood clots?  No    · Is patient discharged on Warfarin / Coumadin?   No     Depression / Suicide Risk    As you are discharged from this [facility] facility, it is important to learn how to keep safe from harming yourself.    Recognize the warning signs:  · Abrupt changes in personality, positive or negative- including increase in energy   · Giving away possessions  · Change in eating patterns- significant weight changes-  positive or negative  · Change in sleeping patterns- unable to sleep or sleeping all the time   · Unwillingness or inability to communicate  · Depression  · Unusual sadness, discouragement and loneliness  · Talk of wanting to die  · Neglect of personal appearance   · Rebelliousness- reckless behavior  · Withdrawal from  people/activities they love  · Confusion- inability to concentrate     If you or a loved one observes any of these behaviors or has concerns about self-harm, here's what you can do:  · Talk about it- your feelings and reasons for harming yourself  · Remove any means that you might use to hurt yourself (examples: pills, rope, extension cords, firearm)  · Get professional help from the community (Mental Health, Substance Abuse, psychological counseling)  · Do not be alone:Call your Safe Contact- someone whom you trust who will be there for you.  · Call your local CRISIS HOTLINE 102-1163 or 175-304-8641  · Call your local Children's Mobile Crisis Response Team [place] (990) 383-5532 or www.Yappe  · Call the toll free National Suicide Prevention Hotlines   · National Suicide Prevention Lifeline 454-938-CTWC (1768)  · National Hope Line Network 800-SUICIDE (166-8935)

## 2018-06-27 NOTE — PROGRESS NOTES
Received report from night RN.  Assumed pt care.  CO 9/10 left shoulder pain.  Will medicate per MAR.  Immobilizer in place.  CMS intact. Updated w/ POC. Will continue with care.

## 2018-06-27 NOTE — CARE PLAN
Problem: Knowledge Deficit  Goal: Knowledge of disease process/condition, treatment plan, diagnostic tests, and medications will improve  Discussed POC, treatments & discharge plan with pt, pt verbalizes understanding.     Problem: Pain Management  Goal: Pain level will decrease to patient's comfort goal  Scheduled & PRN medications given - see MAR

## 2018-07-16 ENCOUNTER — PATIENT MESSAGE (OUTPATIENT)
Dept: MEDICAL GROUP | Facility: PHYSICIAN GROUP | Age: 63
End: 2018-07-16

## 2018-07-16 NOTE — TELEPHONE ENCOUNTER
From: Camryn Peter  To: Jyoti Ortiz, Med Ass't  Sent: 7/16/2018 10:52 AM PDT  Subject: Prescription Question    Thank you so much    ----- Message -----  From: Jyoti Ortiz, Med Ass't  Sent: 7/16/18, 10:38 AM  To: Camryn Peter  Subject: RE: Prescription Question    Yes I will request a refill for you    Jyoti    ----- Message -----   From: Camryn Peter   Sent: 7/16/2018 9:26 AM PDT   To: Lynn Buckley D.O.  Subject: Prescription Question    Hello , I'm going thru a divorce and I'm hyperventilating and anxious walking in circles is there any way Dr Buckley can renew my Xanax? The stress is great I just took my last pill     Thank you

## 2018-07-18 ENCOUNTER — PATIENT MESSAGE (OUTPATIENT)
Dept: MEDICAL GROUP | Facility: PHYSICIAN GROUP | Age: 63
End: 2018-07-18

## 2018-07-18 RX ORDER — ALPRAZOLAM 0.25 MG/1
0.25 TABLET ORAL NIGHTLY PRN
Qty: 30 TAB | Refills: 0 | OUTPATIENT
Start: 2018-07-18 | End: 2018-08-17

## 2018-07-19 ENCOUNTER — OFFICE VISIT (OUTPATIENT)
Dept: MEDICAL GROUP | Facility: MEDICAL CENTER | Age: 63
End: 2018-07-19
Payer: MEDICARE

## 2018-07-19 VITALS
WEIGHT: 102 LBS | SYSTOLIC BLOOD PRESSURE: 110 MMHG | RESPIRATION RATE: 16 BRPM | TEMPERATURE: 98.9 F | OXYGEN SATURATION: 94 % | HEART RATE: 92 BPM | DIASTOLIC BLOOD PRESSURE: 68 MMHG | BODY MASS INDEX: 18.07 KG/M2 | HEIGHT: 63 IN

## 2018-07-19 DIAGNOSIS — F41.9 ANXIETY: ICD-10-CM

## 2018-07-19 DIAGNOSIS — F41.0 PANIC ATTACK: ICD-10-CM

## 2018-07-19 PROCEDURE — 99214 OFFICE O/P EST MOD 30 MIN: CPT | Performed by: NURSE PRACTITIONER

## 2018-07-19 RX ORDER — ALPRAZOLAM 0.5 MG/1
.25-.5 TABLET ORAL
Qty: 30 TAB | Refills: 1 | Status: SHIPPED
Start: 2018-07-19 | End: 2018-08-18

## 2018-07-19 NOTE — PROGRESS NOTES
Subjective:   Camryn Peter is a 63 y.o. female here today for anxiety and panic attacks.    She is an established patient of Dr. Buckley, however Dr. Buckley does not prescribe controlled substances and advised her to see a different provider for this.    Anxiety  Has used Xanax in the past for anxiety and panic attacks. Currently going through a divorce, found out that her  has been cheating on her, and having significant anxiety and panic attacks, states she was hyperventilating and almost passed out a few nights ago. Was taking 0.25 mg in the past which worked well, she prefers 0.5 mg tablets to break in half as this is cheaper for her. She does not drink alcohol. She does not think she will use these for very long or plan to use these daily, only when she develops panic attacks. She does not want to start a daily medication.  She denies any suicidal or homicidal thoughts.  She denies any depression, she states she is just sad and disappointed.  She is having anxiety and panic attacks over having to quickly sell her house and move out as she cannot afford it, she had a significant shoulder surgery 3 weeks ago and is still in an immobilizer brace so moving has been very difficult.         Current medicines (including changes today)  Current Outpatient Prescriptions   Medication Sig Dispense Refill   • ALPRAZolam (XANAX) 0.5 MG Tab Take 0.5-1 Tabs by mouth 1 time daily as needed for Anxiety for up to 30 days. 30 Tab 1   • Adapalene-Benzoyl Peroxide (EPIDUO FORTE EX) Apply 1 Each to affected area(s) 1 time daily as needed.     • pantoprazole (PROTONIX) 40 MG Tablet Delayed Response Take 1 Tab by mouth 2 Times a Day. 180 Tab 3   • conjugated estrogen (PREMARIN) 0.625 MG/GM CREA Insert 0.5 g in vagina 2 times a week.       No current facility-administered medications for this visit.      She  has a past medical history of Arthritis (06/18/2018); Heart burn; Hiatus hernia syndrome (2014); High cholesterol;  "Indigestion; Migraine; Prediabetes; Shoulder pain, left (06/14/2018); and Sleep apnea.    ROS   No chest pain, no shortness of breath, no abdominal pain  Positive ROS as per HPI.  All other systems reviewed and are negative.     Objective:     Blood pressure 110/68, pulse 92, temperature 37.2 °C (98.9 °F), resp. rate 16, height 1.6 m (5' 2.99\"), weight 46.3 kg (102 lb), SpO2 94 %. Body mass index is 18.07 kg/m².   Physical Exam:  Constitutional: Alert, no distress.  Skin: Warm, dry, good turgor, no rashes in visible areas.  Eye: Equal, round and reactive, conjunctiva clear, lids normal.  ENMT: Lips without lesions, good dentition, oropharynx clear.  Neck: Trachea midline, no masses, no thyromegaly. No cervical or supraclavicular lymphadenopathy  Respiratory: Unlabored respiratory effort, lungs clear to auscultation, no wheezes, no ronchi.  Cardiovascular: Normal S1, S2, no murmur, no edema.  Abdomen: Soft, non-tender, no masses, no hepatosplenomegaly.  Psych: Alert and oriented x3, normal affect and mood.        Assessment and Plan:   The following treatment plan was discussed    1. Anxiety  Unstable.  Alprazolam 0.25 mg as needed for panic attack.  Advised patient to use these very sparingly.  She is not to drink any alcohol or use any other controlled substances with this.  Discussed in depth the risk of accidental overdose and respiratory depression with this type of medication.  Patient verbalizes understanding.  She states she has not had issues with sedation or oversedation from this medication in the past.  - ALPRAZolam (XANAX) 0.5 MG Tab; Take 0.5-1 Tabs by mouth 1 time daily as needed for Anxiety for up to 30 days.  Dispense: 30 Tab; Refill: 1    2. Panic attack  As above.  - ALPRAZolam (XANAX) 0.5 MG Tab; Take 0.5-1 Tabs by mouth 1 time daily as needed for Anxiety for up to 30 days.  Dispense: 30 Tab; Refill: 1      Followup: Return if symptoms worsen or fail to improve.    I have placed the below orders " and discussed them with an approved delegating provider. The MA is performing the below orders under the direction of Dr. Ricketts

## 2018-07-19 NOTE — TELEPHONE ENCOUNTER
From: Camryn Peter  To: Jyoti Ortiz Med Ass't  Sent: 7/18/2018 4:05 PM PDT  Subject: Prescription Question    Hi it's been 3 days and I don't have my prescription . I think because a doctor Kamla prescribed it for me last time, do I need to drive there for a paper prescription?    ----- Message -----  From: Jyoti Ortiz Med Ass't  Sent: 7/16/18, 11:00 AM  To: Camryn Peter  Subject: RE: Prescription Question    You are welcome    Jyoti    ----- Message -----   From: Camryn Peter   Sent: 7/16/2018 10:52 AM PDT   To: Demarcus Griffith Ass't  Subject: Prescription Question    Thank you so much    ----- Message -----  From: Jyoti Ortiz Med Ass't  Sent: 7/16/18, 10:38 AM  To: Camryn Peter  Subject: RE: Prescription Question    Yes I will request a refill for you    Jyoti    ----- Message -----   From: Camryn Peter   Sent: 7/16/2018 9:26 AM PDT   To: Lynn Buckley D.O.  Subject: Prescription Question    Hello , I'm going thru a divorce and I'm hyperventilating and anxious walking in circles is there any way Dr Buckley can renew my Xanax? The stress is great I just took my last pill     Thank you

## 2018-07-19 NOTE — ASSESSMENT & PLAN NOTE
Has used Xanax in the past for anxiety and panic attacks. Currently going through a divorce, found out that her  has been cheating on her, and having significant anxiety and panic attacks, states she was hyperventilating and almost passed out a few nights ago. Was taking 0.25 mg in the past which worked well, she prefers 0.5 mg tablets to break in half as this is cheaper for her. She does not drink alcohol. She does not think she will use these for very long or plan to use these daily, only when she develops panic attacks. She does not want to start a daily medication.  She denies any suicidal or homicidal thoughts.  She denies any depression, she states she is just sad and disappointed.  She is having anxiety and panic attacks over having to quickly sell her house and move out as she cannot afford it, she had a significant shoulder surgery 3 weeks ago and is still in an immobilizer brace so moving has been very difficult.

## 2018-08-07 RX ORDER — VENLAFAXINE HYDROCHLORIDE 37.5 MG/1
37.5 CAPSULE, EXTENDED RELEASE ORAL DAILY
Qty: 30 CAP | Refills: 2 | Status: SHIPPED | OUTPATIENT
Start: 2018-08-07 | End: 2018-10-04 | Stop reason: SDUPTHER

## 2018-08-21 NOTE — PROGRESS NOTES
DATE OF ADMISSION:  06/26/2018    DATE OF DISCHARGE:  06/27/2018    PROCEDURE:  Left total shoulder arthroplasty and left proximal biceps   tenodesis.    HOSPITAL COURSE:  Patient was brought to the operating room where she   underwent the above listed procedures without complication.  She then was   taken to the postanesthesia care unit, followed by the orthopedic floor where   she underwent an unremarkable postoperative course.  At the time of discharge,   she was tolerating a general diet, ambulating without assistance, and her   pain well controlled with oral medications.    DISMISSAL PHYSICAL FINDINGS:  Her incision is clean, dry, and intact without   drainage.  She is neurovascularly intact.    FOLLOWUP:  She will follow up Dr. Jeornimo in the outpatient clinic in 1 week.       ____________________________________     JULIAN JERONIMO MD RD / [place]    DD:  08/21/2018 13:14:30  DT:  08/21/2018 13:55:30    D#:  [id number]  Job#:  [id number]

## 2018-09-02 ENCOUNTER — OFFICE VISIT (OUTPATIENT)
Dept: URGENT CARE | Facility: PHYSICIAN GROUP | Age: 63
End: 2018-09-02
Payer: MEDICARE

## 2018-09-02 VITALS
DIASTOLIC BLOOD PRESSURE: 68 MMHG | OXYGEN SATURATION: 96 % | SYSTOLIC BLOOD PRESSURE: 98 MMHG | WEIGHT: 103 LBS | HEIGHT: 63 IN | BODY MASS INDEX: 18.25 KG/M2 | TEMPERATURE: 99.6 F | HEART RATE: 90 BPM | RESPIRATION RATE: 18 BRPM

## 2018-09-02 DIAGNOSIS — B37.9 YEAST INFECTION: ICD-10-CM

## 2018-09-02 DIAGNOSIS — J02.9 VIRAL PHARYNGITIS: ICD-10-CM

## 2018-09-02 LAB
INT CON NEG: NEGATIVE
INT CON POS: POSITIVE
S PYO AG THROAT QL: NORMAL

## 2018-09-02 PROCEDURE — 87880 STREP A ASSAY W/OPTIC: CPT | Performed by: PHYSICIAN ASSISTANT

## 2018-09-02 PROCEDURE — 99214 OFFICE O/P EST MOD 30 MIN: CPT | Performed by: PHYSICIAN ASSISTANT

## 2018-09-02 RX ORDER — FLUCONAZOLE 150 MG/1
TABLET ORAL
Qty: 2 TAB | Refills: 0 | Status: SHIPPED | OUTPATIENT
Start: 2018-09-02 | End: 2018-10-31

## 2018-09-02 RX ORDER — BIOTIN 1000 MCG
TABLET,CHEWABLE ORAL
COMMUNITY

## 2018-09-02 RX ORDER — M-VIT,TX,IRON,MINS/CALC/FOLIC 27MG-0.4MG
1 TABLET ORAL DAILY
COMMUNITY

## 2018-09-02 NOTE — PROGRESS NOTES
Chief Complaint   Patient presents with   • Sore Throat     LOSS OF VOICE, x LAST NIGHT        HISTORY OF PRESENT ILLNESS: Patient is a 63 y.o. female who presents today for 1 day history of sore throat.  She notes that it kept her up due to the pain last night.  Diffuse pain with swallowing. Some clear runny nose today started.  Slight congestion.  No coughing.  She has not felt feverish or with body aches or chills.  No attempted interventions.  She states she is unable to take NSAIDs    A few days of suspected yeast infection.  Would like for this.  Vaginal discomfort and itching. Feels like yeast infection at this time.     Patient Active Problem List    Diagnosis Date Noted   • Anxiety 07/19/2018   • Acne vulgaris 01/16/2018   • Other fatigue 01/16/2018   • Other insomnia 01/16/2018   • Acute cystitis with hematuria 09/21/2017   • Gastroesophageal reflux disease without esophagitis 03/10/2016   • Other plastic surgery for unacceptable cosmetic appearance 02/02/2015       Allergies:Nsaids    Current Outpatient Prescriptions Ordered in [facility]   Medication Sig Dispense Refill   • vitamin D (CHOLECALCIFEROL) 1000 UNIT Tab Take 1,000 Units by mouth every day.     • Biotin 1000 MCG Chew Tab Take  by mouth.     • therapeutic multivitamin-minerals (THERAGRAN-M) Tab Take 1 Tab by mouth every day.     • venlafaxine XR (EFFEXOR XR) 37.5 MG CAPSULE SR 24 HR Take 1 Cap by mouth every day. 30 Cap 2   • pantoprazole (PROTONIX) 40 MG Tablet Delayed Response TAKE 1 TABLET BY MOUTH TWO  TIMES DAILY 180 Tab 3   • conjugated estrogen (PREMARIN) 0.625 MG/GM CREA Insert 0.5 g in vagina 2 times a week.     • Adapalene-Benzoyl Peroxide (EPIDUO FORTE EX) Apply 1 Each to affected area(s) 1 time daily as needed.       No current Epic-ordered facility-administered medications on file.        Past Medical History:   Diagnosis Date   • Arthritis 06/18/2018    bilateral shoulders   • Heart burn     Treated with protonix   • Hiatus hernia  "syndrome     Repaired with gastric sleeve.   • High cholesterol     No meds after gastric sleeve    • Indigestion     Treated with protonix   • Migraine    • Prediabetes     Prio to gastric sleeve    • Shoulder pain, left 2018    Shoulder, arm, and elbow.   • Sleep apnea     No CPAP. Had oral appliance until gastric sleeve done . No restudy done.        Social History   Substance Use Topics   • Smoking status: Never Smoker   • Smokeless tobacco: Never Used   • Alcohol use 0.0 oz/week      Comment: 2 per month, rare       Family Status   Relation Status   • Mo Alive   • Fa      Family History   Problem Relation Age of Onset   • Diabetes Mother    • Heart Disease Father         RF       ROS:  Review of Systems   Constitutional: Negative for fever, chills, weight loss and malaise/fatigue.   HENT: SEE HPI  Eyes: Negative for blurred vision.   Respiratory: Negative for cough, sputum production, shortness of breath and wheezing.    Cardiovascular: Negative for chest pain, palpitations, orthopnea and leg swelling.   Gastrointestinal: Negative for heartburn, nausea, vomiting and abdominal pain.   Genitourinary: SEE HPI    Exam:  Blood pressure (!) 98/68, pulse 90, temperature 37.6 °C (99.6 °F), resp. rate 18, height 1.6 m (5' 2.99\"), weight 46.7 kg (103 lb), SpO2 96 %.  General:  Well nourished, well developed female in NAD  Eyes: PERRLA, EOM within normal limits, no conjunctival injection, no scleral icterus, visual fields and acuity grossly intact.  Ears: Normal shape and symmetry, no tenderness, no discharge. External canals are without any significant edema or erythema. Tympanic membranes are without any inflammation, no effusion. Gross auditory acuity is intact  Nose: Symmetrical, sinuses without tenderness, clear rhinorrhea.   Mouth: reasonable hygiene, mild posterior erythema without exudates.  Tonsils are surgically absent.   Neck: no masses, range of motion within normal limits, no " tracheal deviation. No lymphadenopathy  Pulmonary: Normal respiratory effort, no wheezes, crackles, or rhonchi.  Cardiovascular: regular rate and rhythm without murmurs, rubs, or gallops.  : defers.   Skin: No visible rashes or lesion. Warm, pink, dry.   Extremities: no clubbing, cyanosis, or edema.  Neuro: A&O x 3. Speech normal/clear.  Normal gait.         Assessment/Plan:  1. Viral pharyngitis  POCT Rapid Strep A    lidocaine viscous 2% (XYLOCAINE) 2 % Solution   2. Yeast infection  fluconazole (DIFLUCAN) 150 MG tablet         -strep negative.   -benign exam.  Discussed that I felt this was viral in nature. Did not see any evidence of a bacterial process. Discussed natural progression and sx care.  -salt gargles, Tylenol for pain/fever.  Lidocaine rx  -Diflucan empirically for suspected yeast infection.  If no improvement recommend RTC for swabs and vaginal exam.        Supportive care, differential diagnoses, and indications for immediate follow-up discussed with patient.   Pathogenesis of diagnosis discussed including typical length and natural progression.   Instructed to return to clinic or nearest emergency department for any change in condition, further concerns, or worsening of symptoms.  Patient states understanding of the plan of care and discharge instructions.        Shanell Saunders P.A.-C.

## 2018-09-25 ENCOUNTER — HOSPITAL ENCOUNTER (OUTPATIENT)
Facility: MEDICAL CENTER | Age: 63
End: 2018-09-25
Attending: NURSE PRACTITIONER
Payer: MEDICARE

## 2018-09-25 ENCOUNTER — OFFICE VISIT (OUTPATIENT)
Dept: URGENT CARE | Facility: CLINIC | Age: 63
End: 2018-09-25
Payer: MEDICARE

## 2018-09-25 VITALS
RESPIRATION RATE: 18 BRPM | DIASTOLIC BLOOD PRESSURE: 70 MMHG | BODY MASS INDEX: 18.61 KG/M2 | TEMPERATURE: 98.6 F | OXYGEN SATURATION: 98 % | HEART RATE: 105 BPM | WEIGHT: 105 LBS | HEIGHT: 63 IN | SYSTOLIC BLOOD PRESSURE: 106 MMHG

## 2018-09-25 DIAGNOSIS — N39.0 URINARY TRACT INFECTION WITH HEMATURIA, SITE UNSPECIFIED: ICD-10-CM

## 2018-09-25 DIAGNOSIS — R30.0 DYSURIA: ICD-10-CM

## 2018-09-25 DIAGNOSIS — R31.9 URINARY TRACT INFECTION WITH HEMATURIA, SITE UNSPECIFIED: ICD-10-CM

## 2018-09-25 LAB
APPEARANCE UR: NORMAL
BILIRUB UR STRIP-MCNC: NORMAL MG/DL
COLOR UR AUTO: YELLOW
GLUCOSE UR STRIP.AUTO-MCNC: NORMAL MG/DL
KETONES UR STRIP.AUTO-MCNC: 5 MG/DL
LEUKOCYTE ESTERASE UR QL STRIP.AUTO: NORMAL
NITRITE UR QL STRIP.AUTO: NORMAL
PH UR STRIP.AUTO: 6 [PH] (ref 5–8)
PROT UR QL STRIP: NORMAL MG/DL
RBC UR QL AUTO: NORMAL
SP GR UR STRIP.AUTO: 1.03
UROBILINOGEN UR STRIP-MCNC: NORMAL MG/DL

## 2018-09-25 PROCEDURE — 99214 OFFICE O/P EST MOD 30 MIN: CPT | Performed by: NURSE PRACTITIONER

## 2018-09-25 PROCEDURE — 87186 SC STD MICRODIL/AGAR DIL: CPT

## 2018-09-25 PROCEDURE — 87086 URINE CULTURE/COLONY COUNT: CPT

## 2018-09-25 PROCEDURE — 81002 URINALYSIS NONAUTO W/O SCOPE: CPT | Performed by: NURSE PRACTITIONER

## 2018-09-25 PROCEDURE — 87077 CULTURE AEROBIC IDENTIFY: CPT

## 2018-09-25 RX ORDER — PHENAZOPYRIDINE HYDROCHLORIDE 200 MG/1
200 TABLET, FILM COATED ORAL 3 TIMES DAILY
Qty: 6 TAB | Refills: 0 | Status: SHIPPED | OUTPATIENT
Start: 2018-09-25 | End: 2018-09-27

## 2018-09-25 RX ORDER — NITROFURANTOIN 25; 75 MG/1; MG/1
100 CAPSULE ORAL EVERY 12 HOURS
Qty: 10 CAP | Refills: 0 | Status: SHIPPED | OUTPATIENT
Start: 2018-09-25 | End: 2018-09-30

## 2018-09-25 ASSESSMENT — ENCOUNTER SYMPTOMS
CHILLS: 0
FLANK PAIN: 0
FEVER: 0

## 2018-09-25 ASSESSMENT — PATIENT HEALTH QUESTIONNAIRE - PHQ9: CLINICAL INTERPRETATION OF PHQ2 SCORE: 0

## 2018-09-25 NOTE — LETTER
September 25, 2018         Patient: Camryn Peter   YOB: 1955   Date of Visit: 9/25/2018           To Whom it May Concern:    Camryn Peter was seen in my clinic on 9/25/2018. She can return to work today.        Sincerely,           ERIS Jett.  Electronically Signed

## 2018-09-25 NOTE — PROGRESS NOTES
Subjective:      Camryn Peter is a 63 y.o. female who presents with UTI (Started this morning, dysuria, discomfort, painful to sit, down, urgency.)            Dysuria    This is a new problem. The current episode started today. The problem has been gradually worsening. The quality of the pain is described as burning. She is sexually active. There is no history of pyelonephritis. Associated symptoms include frequency, hesitancy and urgency. Pertinent negatives include no chills, discharge or flank pain. She has tried increased fluids and NSAIDs for the symptoms. The treatment provided no relief. There is no history of recurrent UTIs or a single kidney.       Review of Systems   Constitutional: Negative for chills and fever.   Genitourinary: Positive for dysuria, frequency, hesitancy and urgency. Negative for flank pain.   All other systems reviewed and are negative.    Past Medical History:   Diagnosis Date   • Arthritis 06/18/2018    bilateral shoulders   • Heart burn     Treated with protonix   • Hiatus hernia syndrome 2014    Repaired with gastric sleeve.   • High cholesterol     No meds after gastric sleeve 2014   • Indigestion     Treated with protonix   • Migraine    • Prediabetes     Prio to gastric sleeve 2014   • Shoulder pain, left 06/14/2018    Shoulder, arm, and elbow.   • Sleep apnea     No CPAP. Had oral appliance until gastric sleeve done 2014. No restudy done.       Past Surgical History:   Procedure Laterality Date   • SHOULDER ARTHROPLASTY TOTAL Left 6/26/2018    Procedure: SHOULDER ARTHROPLASTY TOTAL;  Surgeon: Eulalio Donovan M.D.;  Location: [facility];  Service: Orthopedics   • LAPAROSCOPY ROBOTIC XI  6/21/2018    Procedure: LAPAROSCOPY ROBOTIC XI;  Surgeon: Elliot Loza M.D.;  Location: [facility];  Service: Gynecology   • SALPINGECTOMY Bilateral 6/21/2018    Procedure: SALPINGECTOMY;  Surgeon: Elliot Loza M.D.;  Location: SURGERY [facility];  Service: Gynecology  "  • OOPHORECTOMY  6/21/2018    Procedure: OOPHORECTOMY;  Surgeon: Elliot Loza M.D.;  Location: SURGERY [facility];  Service: Gynecology   • RHYTIDECTOMY  2/2/2015    Performed by Alverto Chisholm M.D. at [facility]   • PLATYSMAPLASTY  2/2/2015    Performed by Alverto Chisholm M.D. at [facility]   • BLEPHAROPLASTY  2/2/2015    Performed by Alverto Chisholm M.D. at [facility]   • GASTRIC SLEEVE LAPAROSCOPY  2014    With cholecystecomy   • DENTAL SURGERY  2014    Olga bone removed from under tongue prior to dental appliance for speep apnea.    • OTHER  2013    facial laser   • COLONOSCOPY  2013   • NEUROMA EXCISION Left 2013   • LIPOSUCTION  2011    laser lipo   • ABDOMINOPLASTY  2006   • LIPOSUCTION  2000   • MAMMOPLASTY REDUCTION Bilateral 1999   • VOCAL CORD POLYP EXCISION  1998   • BLEPHAROPLASTY Bilateral 1997   • HYSTERECTOMY, VAGINAL  1984   • APPENDECTOMY  1972    With scraping of fallopian tubes.    • TONSILLECTOMY  1959      Social History     Social History   • Marital status:      Spouse name: N/A   • Number of children: N/A   • Years of education: N/A     Occupational History   • Not on file.     Social History Main Topics   • Smoking status: Never Smoker   • Smokeless tobacco: Never Used   • Alcohol use 0.0 oz/week      Comment: 2 per month, rare   • Drug use: No   • Sexual activity: Yes     Partners: Male     Other Topics Concern   • Not on file     Social History Narrative   • No narrative on file          Objective:     /70 (BP Location: Right arm, Patient Position: Sitting, BP Cuff Size: Adult)   Pulse (!) 105   Temp 37 °C (98.6 °F) (Temporal)   Resp 18   Ht 1.6 m (5' 3\")   Wt 47.6 kg (105 lb)   SpO2 98%   BMI 18.60 kg/m²      Physical Exam   Constitutional: She is oriented to person, place, and time. Vital signs are normal. She appears well-developed and well-nourished.   HENT:   Head: Normocephalic and atraumatic.   Eyes: Pupils " are equal, round, and reactive to light. EOM are normal.   Neck: Normal range of motion.   Cardiovascular: Normal rate and regular rhythm.    Pulmonary/Chest: Effort normal.   Abdominal: Soft. Normal appearance. There is tenderness in the suprapubic area. There is no rigidity, no rebound, no guarding and no CVA tenderness.   Musculoskeletal: Normal range of motion.   Neurological: She is alert and oriented to person, place, and time.   Skin: Skin is warm and dry. Capillary refill takes less than 2 seconds.   Psychiatric: She has a normal mood and affect. Her speech is normal and behavior is normal. Thought content normal.   Vitals reviewed.         Lab Results   Component Value Date/Time    POCCOLOR YELLOW 09/25/2018 12:44 PM    POCCOLOR Yellow 04/25/2018 03:50 PM    POCAPPEAR CLOUDY 09/25/2018 12:44 PM    POCAPPEAR Clear 04/25/2018 03:50 PM    POCLEUKEST TRACE 09/25/2018 12:44 PM    POCLEUKEST Negative 04/25/2018 03:50 PM    POCNITRITE NEG 09/25/2018 12:44 PM    POCNITRITE Negative 04/25/2018 03:50 PM    POCUROBILIGE NEG 09/25/2018 12:44 PM    POCPROTEIN TRACE 09/25/2018 12:44 PM    POCPROTEIN Negative 04/25/2018 03:50 PM    POCURPH 6.0 09/25/2018 12:44 PM    POCURPH 5.0 04/25/2018 03:50 PM    POCBLOOD SMALL 09/25/2018 12:44 PM    POCBLOOD Negative 04/25/2018 03:50 PM    POCSPGRV 1.030 09/25/2018 12:44 PM    POCSPGRV 1.025 04/25/2018 03:50 PM    POCKETONES 5 09/25/2018 12:44 PM    POCKETONES 40 (A) 04/25/2018 03:50 PM    POCBILIRUBIN NEG 09/25/2018 12:44 PM    POCGLUCUA NEG 09/25/2018 12:44 PM    POCGLUCUA Negative 04/25/2018 03:50 PM           Assessment/Plan:     1. Dysuria  - POCT Urinalysis    2. Urinary tract infection with hematuria, site unspecified  - Urine Culture; Future  - nitrofurantoin monohydr macro (MACROBID) 100 MG Cap; Take 1 Cap by mouth every 12 hours for 5 days.  Dispense: 10 Cap; Refill: 0  - phenazopyridine (PYRIDIUM) 200 MG Tab; Take 1 Tab by mouth 3 times a day for 2 days.  Dispense: 6 Tab;  Refill: 0    Increase water intake  Alternate tylenol and ibuprofen as needed for pain  Will call with cx results if I need to change ABX  Supportive care, differential diagnoses, and indications for immediate follow-up discussed with patient.    Pathogenesis of diagnosis discussed including typical length and natural progression.      Instructed to return to  or nearest emergency department if symptoms fail to improve, for any change in condition, further concerns, or new concerning symptoms.  Patient states understanding of the plan of care and discharge instructions.

## 2018-09-26 DIAGNOSIS — R31.9 URINARY TRACT INFECTION WITH HEMATURIA, SITE UNSPECIFIED: ICD-10-CM

## 2018-09-26 DIAGNOSIS — N39.0 URINARY TRACT INFECTION WITH HEMATURIA, SITE UNSPECIFIED: ICD-10-CM

## 2018-09-28 LAB
BACTERIA UR CULT: ABNORMAL
BACTERIA UR CULT: ABNORMAL
SIGNIFICANT IND 70042: ABNORMAL
SITE SITE: ABNORMAL
SOURCE SOURCE: ABNORMAL

## 2018-10-04 RX ORDER — ALPRAZOLAM 0.5 MG/1
TABLET ORAL
Refills: 1 | COMMUNITY
Start: 2018-09-24 | End: 2018-10-31 | Stop reason: SDUPTHER

## 2018-10-04 RX ORDER — ALPRAZOLAM 0.5 MG/1
TABLET ORAL
Qty: 30 TAB | Refills: 1 | Status: CANCELLED | OUTPATIENT
Start: 2018-10-04

## 2018-10-04 RX ORDER — VENLAFAXINE HYDROCHLORIDE 37.5 MG/1
37.5 CAPSULE, EXTENDED RELEASE ORAL DAILY
Qty: 90 CAP | Refills: 0 | Status: SHIPPED | OUTPATIENT
Start: 2018-10-04

## 2018-10-19 NOTE — TELEPHONE ENCOUNTER
This was a one time, short term medication for situational anxiety and panic attacks. Patient needs to talk to her PCP about continued anxiety issues and longer term treatment options, I cannot continue prescribing this medication if she is not my established patient.     ERIS Walden.

## 2018-10-24 RX ORDER — ALPRAZOLAM 0.5 MG/1
TABLET ORAL
Qty: 30 TAB | Refills: 1
Start: 2018-10-24

## 2018-10-24 NOTE — TELEPHONE ENCOUNTER
Pt is moving to [place] in 2 weeks. Is there any way we can give her another 30 day supply of xanax?  Last refilled by Esthela Muir. At [facility]. Or should I have her come in for an appt? I have a block tomorrow?       Was the patient seen in the last year in this department? Yes  ( pt hasnt seen [facility] in 2 years but has been seen by others within [facility])     Does patient have an active prescription for medications requested? No     Received Request Via: Patient

## 2018-10-31 ENCOUNTER — OFFICE VISIT (OUTPATIENT)
Dept: MEDICAL GROUP | Facility: PHYSICIAN GROUP | Age: 63
End: 2018-10-31
Payer: MEDICARE

## 2018-10-31 VITALS
SYSTOLIC BLOOD PRESSURE: 102 MMHG | BODY MASS INDEX: 18.78 KG/M2 | HEIGHT: 63 IN | HEART RATE: 70 BPM | OXYGEN SATURATION: 96 % | WEIGHT: 106 LBS | DIASTOLIC BLOOD PRESSURE: 64 MMHG

## 2018-10-31 DIAGNOSIS — R93.7 ABNORMAL BONE DENSITY SCREENING: ICD-10-CM

## 2018-10-31 DIAGNOSIS — F41.9 ANXIETY: ICD-10-CM

## 2018-10-31 DIAGNOSIS — Z23 NEED FOR VACCINATION: ICD-10-CM

## 2018-10-31 DIAGNOSIS — R53.83 OTHER FATIGUE: ICD-10-CM

## 2018-10-31 DIAGNOSIS — G43.009 MIGRAINE WITHOUT AURA AND WITHOUT STATUS MIGRAINOSUS, NOT INTRACTABLE: ICD-10-CM

## 2018-10-31 DIAGNOSIS — G47.09 OTHER INSOMNIA: ICD-10-CM

## 2018-10-31 DIAGNOSIS — K21.9 GASTROESOPHAGEAL REFLUX DISEASE WITHOUT ESOPHAGITIS: ICD-10-CM

## 2018-10-31 PROCEDURE — 90670 PCV13 VACCINE IM: CPT | Performed by: FAMILY MEDICINE

## 2018-10-31 PROCEDURE — G0009 ADMIN PNEUMOCOCCAL VACCINE: HCPCS | Performed by: FAMILY MEDICINE

## 2018-10-31 PROCEDURE — 99214 OFFICE O/P EST MOD 30 MIN: CPT | Mod: 25 | Performed by: FAMILY MEDICINE

## 2018-10-31 RX ORDER — ALPRAZOLAM 0.5 MG/1
0.5 TABLET ORAL NIGHTLY PRN
Qty: 30 TAB | Refills: 2 | Status: SHIPPED | OUTPATIENT
Start: 2018-10-31 | End: 2019-01-16 | Stop reason: SDUPTHER

## 2018-10-31 RX ORDER — BUTALBITAL, ACETAMINOPHEN AND CAFFEINE 50; 325; 40 MG/1; MG/1; MG/1
1 TABLET ORAL EVERY 4 HOURS PRN
Qty: 30 TAB | Refills: 0 | Status: SHIPPED | OUTPATIENT
Start: 2018-10-31 | End: 2018-11-30

## 2018-10-31 ASSESSMENT — PAIN SCALES - GENERAL: PAINLEVEL: 8=MODERATE-SEVERE PAIN

## 2018-10-31 NOTE — ASSESSMENT & PLAN NOTE
Chronic stable medical condition.  Patient has been on pantoprazole 80 mg for years recently decreased her own dose down to 40 mg/day.  Counseled on possibly discontinuing her pantoprazole with a long taper given her long history of PPI therapy.

## 2018-10-31 NOTE — ASSESSMENT & PLAN NOTE
Chronic ongoing medical condition.  Patient takes Xanax 0.5 mg as needed.  Recently found that her  has been cheating on her and this is induced a lot of anxiety patient is also concurrently being treated for depression with venlafaxine.

## 2018-10-31 NOTE — ASSESSMENT & PLAN NOTE
New problem.  Patient has a history of migraines and presents with one today.  Patient cannot take ibuprofen due to the fact that she has had a gastric sleeve and is at high risk for ulcers with NSAID medication.  Previous effective relief has been achieved with Fioricet.  Pain today is biparietal, nonradiating, 8 out of 10 in severity, worse with light and loud sounds.

## 2018-10-31 NOTE — PROGRESS NOTES
CC:  Diagnoses of Anxiety, Migraine without aura and without status migrainosus, not intractable, Other fatigue, Gastroesophageal reflux disease without esophagitis, Abnormal bone density screening, Need for vaccination, and Other insomnia were pertinent to this visit.    HISTORY OF THE PRESENT ILLNESS: Patient is a 63 y.o. female. This pleasant patient is here today to establish new primary care provider.    Health Maintenance: Completed      Anxiety  Chronic ongoing medical condition.  Patient takes Xanax 0.5 mg as needed.  Recently found that her  has been cheating on her and this is induced a lot of anxiety patient is also concurrently being treated for depression with venlafaxine.    Migraine without aura and without status migrainosus, not intractable  New problem.  Patient has a history of migraines and presents with one today.  Patient cannot take ibuprofen due to the fact that she has had a gastric sleeve and is at high risk for ulcers with NSAID medication.  Previous effective relief has been achieved with Fioricet.  Pain today is biparietal, nonradiating, 8 out of 10 in severity, worse with light and loud sounds.    Other fatigue  Chronic ongoing medical condition.  Likely related to recent total shoulder and abdominal surgery (hysterectomy)    Other insomnia  Chronic stable medical condition.  Insomnia due to anxiety.    Gastroesophageal reflux disease without esophagitis  Chronic stable medical condition.  Patient has been on pantoprazole 80 mg for years recently decreased her own dose down to 40 mg/day.  Counseled on possibly discontinuing her pantoprazole with a long taper given her long history of PPI therapy.    Allergies: Nsaids    Current Outpatient Prescriptions Ordered in [facility]   Medication Sig Dispense Refill   • ALPRAZolam (XANAX) 0.5 MG Tab Take 1 Tab by mouth at bedtime as needed for Sleep for up to 30 days. 30 Tab 2   • acetaminophen/caffeine/butalbital 325-40-50 mg (FIORICET)  -40 MG Tab Take 1 Tab by mouth every four hours as needed for Headache for up to 30 days. 30 Tab 0   • PREMARIN 0.625 MG/GM Cream INSERT 1/2 APPLICATORFUL (1 GRAM) VAGINALLY TWO DAYS  PER WEEK 30 g 3   • venlafaxine XR (EFFEXOR XR) 37.5 MG CAPSULE SR 24 HR Take 1 Cap by mouth every day. 90 Cap 0   • vitamin D (CHOLECALCIFEROL) 1000 UNIT Tab Take 1,000 Units by mouth every day.     • Biotin 1000 MCG Chew Tab Take  by mouth.     • therapeutic multivitamin-minerals (THERAGRAN-M) Tab Take 1 Tab by mouth every day.     • pantoprazole (PROTONIX) 40 MG Tablet Delayed Response TAKE 1 TABLET BY MOUTH TWO  TIMES DAILY 180 Tab 3     No current Epic-ordered facility-administered medications on file.        Past Medical History:   Diagnosis Date   • Arthritis 06/18/2018    bilateral shoulders   • Heart burn     Treated with protonix   • Hiatus hernia syndrome 2014    Repaired with gastric sleeve.   • High cholesterol     No meds after gastric sleeve 2014   • Indigestion     Treated with protonix   • Migraine    • Prediabetes     Prio to gastric sleeve 2014   • Shoulder pain, left 06/14/2018    Shoulder, arm, and elbow.   • Sleep apnea     No CPAP. Had oral appliance until gastric sleeve done 2014. No restudy done.        Past Surgical History:   Procedure Laterality Date   • SHOULDER ARTHROPLASTY TOTAL Left 6/26/2018    Procedure: SHOULDER ARTHROPLASTY TOTAL;  Surgeon: Eulalio Donovan M.D.;  Location: [facility];  Service: Orthopedics   • LAPAROSCOPY ROBOTIC XI  6/21/2018    Procedure: LAPAROSCOPY ROBOTIC XI;  Surgeon: Elliot Loza M.D.;  Location: [facility];  Service: Gynecology   • SALPINGECTOMY Bilateral 6/21/2018    Procedure: SALPINGECTOMY;  Surgeon: Elliot Loza M.D.;  Location: [facility];  Service: Gynecology   • OOPHORECTOMY  6/21/2018    Procedure: OOPHORECTOMY;  Surgeon: Elliot Loza M.D.;  Location: [facility];  Service: Gynecology   • RHYTIDECTOMY  2/2/2015     "Performed by Alverto Chisholm M.D. at SURGERY [facility]   • PLATYSMAPLASTY  2/2/2015    Performed by Alverto Chisholm M.D. at [facility]   • BLEPHAROPLASTY  2/2/2015    Performed by Alverto Chisholm M.D. at [facility]   • GASTRIC SLEEVE LAPAROSCOPY  2014    With cholecystecomy   • DENTAL SURGERY  2014    Olga bone removed from under tongue prior to dental appliance for speep apnea.    • OTHER  2013    facial laser   • COLONOSCOPY  2013   • NEUROMA EXCISION Left 2013   • LIPOSUCTION  2011    laser lipo   • ABDOMINOPLASTY  2006   • LIPOSUCTION  2000   • MAMMOPLASTY REDUCTION Bilateral 1999   • VOCAL CORD POLYP EXCISION  1998   • BLEPHAROPLASTY Bilateral 1997   • HYSTERECTOMY, VAGINAL  1984   • APPENDECTOMY  1972    With scraping of fallopian tubes.    • TONSILLECTOMY  1959       Social History   Substance Use Topics   • Smoking status: Never Smoker   • Smokeless tobacco: Never Used   • Alcohol use 0.0 oz/week      Comment: 2 per month, rare       Social History     Social History Narrative   • No narrative on file       Family History   Problem Relation Age of Onset   • Diabetes Mother    • Heart Disease Father         RF       ROS:   Constitutional: No Fevers, Chills  Eyes: No eye pain  ENT: No sore throat  Resp: No Shortness of breath  CV: No Chest pain  GI: No Nausea/Vomiting  MSK: No weakness  Skin: No rashes  Neuro: +Headaches  Psych: No Suicidal ideations        Exam: Blood pressure 102/64, pulse 70, height 1.6 m (5' 3\"), weight 48.1 kg (106 lb), SpO2 96 %, not currently breastfeeding. Body mass index is 18.78 kg/m².    GENERAL: No acute distress  HENT: Atraumatic, normocephalic  EYES: Extraocular movements intact, pupils equal and reactive to light  NECK: Supple, FROM  CHEST: No deformities, Equal chest expansion  RESP: Unlabored, no stridor or audible wheeze  ABD: Soft, Nontender, Non-Distended  Extremities: No Clubbing, Cyanosis, or Edema  Skin: Warm/dry, without " sheeba  Neuro: A/O x 4, CN 2-12 Grossly intact, Motor/sensory grosly intact  Psych: Normal behavior, normal affect      Lab review:  orders written for new lab studies as appropriate; see orders    DEXA scan ordered.    Assessment/Plan:  1. Anxiety  Chronic stable medical condition.  Patient uses alprazolam 0.25 mg as needed for anxiety.  Can likely taper as her marital issues began to resolve.  - ALPRAZolam (XANAX) 0.5 MG Tab; Take 1 Tab by mouth at bedtime as needed for Sleep for up to 30 days.  Dispense: 30 Tab; Refill: 2  - [facility] PAIN MANAGEMENT SCREEN; Future  - Controlled Substance Treatment Agreement  - Consent for controlled substance prescription    2. Migraine without aura and without status migrainosus, not intractable  Chronic stable medical condition with no recurrence today.  Patient has been unable to take NSAIDs which are first-line due to her history of gastric sleeve.  Fioricet has been effective for her in the past and we will set her up with a prescription today no refills.  - acetaminophen/caffeine/butalbital 325-40-50 mg (FIORICET) -40 MG Tab; Take 1 Tab by mouth every four hours as needed for Headache for up to 30 days.  Dispense: 30 Tab; Refill: 0    3. Other fatigue  Chronic ongoing medical condition.  Likely as a result of her ongoing anxiety and depression.  We will continue to follow.  Labs as below.  - CBC WITH DIFFERENTIAL; Future  - COMP METABOLIC PANEL; Future  - LIPID PROFILE; Future  - VITAMIN D,25 HYDROXY; Future    4. Gastroesophageal reflux disease without esophagitis  Chronic stable medical condition.  Counseled on tapering PPI therapy and ultimately discontinuing.  Patient in the interim can use H2 blockers or over-the-counter calcium chews.  - CBC WITH DIFFERENTIAL; Future  - COMP METABOLIC PANEL; Future  - LIPID PROFILE; Future  - VITAMIN D,25 HYDROXY; Future    5. Abnormal bone density screening  - DS-BONE DENSITY STUDY (DEXA); Future    6. Need for vaccination  -  Prevnar 13 PCV-13    Follow-up in 3 months.    Please note that this dictation was created using voice recognition software. I have made every reasonable attempt to correct obvious errors, but I expect that there are errors of grammar and possibly content that I did not discover before finalizing the note.

## 2018-10-31 NOTE — ASSESSMENT & PLAN NOTE
Chronic ongoing medical condition.  Likely related to recent total shoulder and abdominal surgery (hysterectomy)

## 2019-01-16 DIAGNOSIS — F41.9 ANXIETY: ICD-10-CM

## 2019-01-16 RX ORDER — ALPRAZOLAM 0.5 MG/1
0.5 TABLET ORAL NIGHTLY PRN
Qty: 30 TAB | Refills: 0 | Status: SHIPPED
Start: 2019-01-16 | End: 2019-08-29 | Stop reason: SDUPTHER

## 2019-01-16 NOTE — TELEPHONE ENCOUNTER
Was the patient seen in the last year in this department? Yes    Does patient have an active prescription for medications requested? No     Received Request Via: Pharmacy         ROBERTO DOWNEY     Age: 63  demographics  Data as of: 01/16/2019              NARCOTIC 270        SEDATIVE 310       STIMULANT 000       NARxSCORES can range from 000 to 999. The first two digits represent the composite percentile risk based on an overall analysis of prescription drug use. The third digit represents the number of active prescriptions. The distribution of scores in the population is such that approximately 75% fall below 200, 95% fall below 500 and 99% fall below 650. The information on this report is not warranted as accurate or complete. This report is based on the search criteria supplied and the data entered by the dispensing pharmacy. For more information about any prescription, please contact the dispensing pharmacy or the prescriber. NARxSCORES and Reports are intended to aid, not replace medical decision making. None of the information presented should be used as sole justification for providing or refusing to provide medications.       Rx Graph Grayed out drugs could not be included in score calculations.   [x] Narcotic [x] Sedative [x] Stimulant [x] Buprenorphine [x] Other    Created with Raphaël 2.2.0All Prescribers  Created with Raphaël 2.2.2Ukdilysu47/[id number] - Josh Madden2 - Esthela Nathl3 - Eulalio Donovan MD4 - Elliot Loza5 - Omi Cason6 - Conrad ESCOTO Slide  Morphine MgEq (MME)  Created with Raphaël 2.2.5217815718  Created with Raphaël 2.2.4Ltefdlbm74/125v2i2n0h  Lorazepam MgEq (LME)  Created with Raphaël 2.2.2381543  Created with Raphaël 2.2.1Hkndgejb35/278a6x3q0o  *Per CDC guidance, the MME conversion factors prescribed or provided as part of the medication-assisted treatment for opioid use disorder should not be used to benchmark against dosage thresholds meant for opioids prescribed for  pain. Buprenorphine products have no agreed upon morphine equivalency, and as partial opioid agonists, are not expected to be associated with overdose risk in the same dose-dependent manner as doses for full agonist opioids. MME = morphine milligram equivalents. LME = Lorazepam milligram equivalents. mg = dose in milligrams.     Data Analysis   Narcotics (270) 2 months 6 months 1 year 2 years   Prescribers (narcotic, sedative) 1  19 3  32 5  37 6  30   Pharmacies (narcotic, sedative) 1  25 1  16 1  13 1  10   Morphine mg 0  0 150  24 1090  66 1190  69   Morphine overlap (1) 0  0 0  0 0  0 0  0           Sedatives (310) 2 months 6 months 1 year 2 years   Prescribers (narcotic, sedative) 1  19 3  32 5  37 6  30   Pharmacies (narcotic, sedative) 1  25 1  16 1  13 1  10   Sedative mg 30  37 120  62 135  57 135  57   Sedative overlap (1) 0  0 0  0 0  0 0  0           Stimulants (000)  2 months 6 months 1 year 2 years   Prescribers (stimulant) 0  0 0  0 0  0 0  0   Pharmacies (stimulant) 0  0 0  0 0  0 0  0   Stimulant days 0  0 0  0 0  0 0  0   Stimulant overlap (1) 0  0 0  0 0  0 0  0      (1) Number of days for which a simliar type of medication was prescribed from different prescribers for use on the same day.         Summary   Total Prescriptions: 10   Total Prescribers: 6   Total Pharmacies: 2   Narcotics*    (excluding buprenorphine)  Current Qty: 0   Current MME/day: 0.00   30 Day Avg MME/day: 0.00   Buprenorphine*   Current Qty: 0   Current mg/day: 0.00   30 Day Avg mg/day: 0.00   Sedatives*   Current Qty: 0   Current LME/day: 0.00   30 Day Avg LME/day: 0.33   *Highlighted drugs could not be included in score calculations   PRESCRIPTIONS  Total Prescriptions: 10   Total Private Pay: 0   Fill Date ID Written Drug Qty Days Prescriber Rx # Pharmacy Refill Daily Dose * Pymt Type    11/27/2018  2   10/31/2018  Alprazolam 0.5 MG Tablet  30 30 Tr [facility]  [id number] [facility] (9360)   1 1.00 LME  [place]   10/31/2018  1   10/31/2018  Alprazolam 0.5 MG Tablet  30 30 Tr Wal  [id number] Wal (3959)  0 1.00 L[place]   2018  1   2018  Alprazolam 0.5 MG Tablet  30 30 As Dim  [id number] Wal (1119)  1 1.00 [place]   2018  1   2018  Hydrocodone-Acetamin 5-325 MG  30 10 Ry   [id number] [facility] (2539)  0 15.00 MME  [place]   2018  1   2018  Alprazolam 0.5 MG Tablet  30 30 As Dim  [id number] [facility] (3909)  0 1.00 [place]   2018  1   2018  Hydrocodone-Acetamin  MG  40 10 Ry   [id number] [facility] (0489)  0 40.00 M[place]   2018  1   2018  Hydrocodone-Acetamin 5-300 MG  28 7 Pe Loza  [id number] Wal (5659)  0 20.00 [place]   2018  1   2018  Hydrocodone-Acetamin  MG  40 5 Ry   [id number] [facility] (7989)  0 80.00 M[place]   2018  1   2018  Alprazolam 0.25 MG Tablet  30 30 Ju Kamla  [id number] [facility] (6039)  0 0.50 LME  [place]   2017  1   2017  Tramadol Hcl 50 MG Tablet  20 3 Ca Sli  [id number] Wal (3549)  0 33.33 MME  [place]   *Per CDC guidance, the MME conversion factors prescribed or provided as part of the medication-assisted treatment for opioid use disorder should not be used to benchmark against dosage thresholds meant for opioids prescribed for pain. Buprenorphine products have no agreed upon morphine equivalency, and as partial opioid agonists, are not expected to be associated with overdose risk in the same dose-dependent manner as doses for full agonist opioids. MME = morphine milligram equivalents. LME = Lorazepam milligram equivalents. MG = dose in milligrams.  PROVIDERS  Total Providers: 6   Name Address City State Zipcode Phone   Conrad ESCOTO Slider [address] [place] [address]    Omi Cason [address] [facility] [address]    Eulalio Donovan  [address] [place] [address]    Elliot Loza  [address] [address] [place] [address]    Josh Madden [address]  [place] [address]    Esthela Muir [address] [place] [address]    PHARMACIES  Total Pharmacies: 2   Name Address City State Zipcode Phone   [facility] [phone]; [place] [address] [place] [address] [phone]   [facility] ([phone]) [place] [address] [phone]

## 2019-01-22 RX ORDER — PANTOPRAZOLE SODIUM 40 MG/1
40 TABLET, DELAYED RELEASE ORAL 2 TIMES DAILY
Qty: 180 TAB | Refills: 0 | Status: SHIPPED | OUTPATIENT
Start: 2019-01-22 | End: 2019-12-11 | Stop reason: SDUPTHER

## 2019-08-29 ENCOUNTER — TELEPHONE (OUTPATIENT)
Dept: MEDICAL GROUP | Facility: PHYSICIAN GROUP | Age: 64
End: 2019-08-29

## 2019-08-29 DIAGNOSIS — F41.9 ANXIETY: ICD-10-CM

## 2019-08-29 RX ORDER — ALPRAZOLAM 0.5 MG/1
0.5 TABLET ORAL NIGHTLY PRN
Qty: 30 TAB | Refills: 0 | Status: SHIPPED | OUTPATIENT
Start: 2019-08-29 | End: 2019-09-28

## 2019-08-29 NOTE — TELEPHONE ENCOUNTER
Was the patient seen in the last year in this department? Yes 10/31/18    Does patient have an active prescription for medications requested? No     Received Request Via: Pharmacy        ROBERTO DOWNEY     Age: 64  demographics  Data as of: 08/29/2019              NARCOTIC 130    Created with Raphaël [id number]   SEDATIVE 160       STIMULANT 000       NARxSCORES can range from 000 to 999. The first two digits represent the composite percentile risk based on an overall analysis of prescription drug use. The third digit represents the number of active prescriptions. The distribution of scores in the population is such that approximately 75% fall below 200, 95% fall below 500 and 99% fall below 650. The information on this report is not warranted as accurate or complete. This report is based on the search criteria supplied and the data entered by the dispensing pharmacy. For more information about any prescription, please contact the dispensing pharmacy or the prescriber. NARxSCORES and Reports are intended to aid, not replace medical decision making. None of the information presented should be used as sole justification for providing or refusing to provide medications.       RX GRAPH Grayed out drugs could not be included in score calculations.   [x] Narcotic [x] Sedative [x] Stimulant [x] Buprenorphine [x] Other    Created with Raphaël 2.2.0All Prescribers  Created with Raphaël 2.2.2Jlpjdefe00/[id number] - Josh Madden2 - Esthela Nathl3 - Eulalio Donovan MD4 - Elliot Loza5 - Omi Cason6 - Conrad ESCOTO Slide  Morphine MgEq (MME)  Created with Raphaël 2.2.2861542416  Created with Raphaël 2.2.9Vucvvidi85/870s2n1k4i  Lorazepam MgEq (LME)  Created with Raphaël 2.2.2119954  Created with Raphaël 2.2.2Zrqgufrm57/605g0s9z3b  *Per CDC guidance, the MME conversion factors prescribed or provided as part of the medication-assisted treatment for opioid use disorder should not be used to  benchmark against dosage thresholds meant for opioids prescribed for pain. Buprenorphine products have no agreed upon morphine equivalency, and as partial opioid agonists, are not expected to be associated with overdose risk in the same dose-dependent manner as doses for full agonist opioids. MME = morphine milligram equivalents. LME = Lorazepam milligram equivalents. mg = dose in milligrams.     Data Analysis   Narcotics (130) 2 months 6 months 1 year 2 years   Prescribers (narcotic, sedative) 0  0 0  0 2  16 6  30   Pharmacies (narcotic, sedative) 0  0 0  0 2  25 2  19   Morphine mg 0  0 0  0 0  0 1190  69   Morphine overlap (1) 0  0 0  0 0  0 0  0           Sedatives (160) 2 months 6 months 1 year 2 years   Prescribers (narcotic, sedative) 0  0 0  0 2  16 6  30   Pharmacies (narcotic, sedative) 0  0 0  0 2  25 2  19   Sedative mg 0  0 0  0 120  55 165  59   Sedative overlap (1) 0  0 0  0 0  0 0  0           Stimulants (000)  2 months 6 months 1 year 2 years   Prescribers (stimulant) 0  0 0  0 0  0 0  0   Pharmacies (stimulant) 0  0 0  0 0  0 0  0   Stimulant days 0  0 0  0 0  0 0  0   Stimulant overlap (1) 0  0 0  0 0  0 0  0      (1) Number of days for which a simliar type of medication was prescribed from different prescribers for use on the same day.         Summary   Total Prescriptions: 11   Total Prescribers: 6   Total Pharmacies: 2   Narcotics*    (excluding buprenorphine)  Current Qty: 0   Current MME/day: 0.00   30 Day Avg MME/day: 0.00   Buprenorphine*   Current Qty: 0   Current mg/day: 0.00   30 Day Avg mg/day: 0.00   Sedatives*   Current Qty: 0   Current LME/day: 0.00   30 Day Avg LME/day: 0.00   *Highlighted drugs could not be included in score calculations   PRESCRIPTIONS  Total Prescriptions: 11   Total Private Pay: 0   Fill Date ID Written Drug Qty Days Prescriber Rx # Pharmacy Refill Daily Dose * Pymt Type    01/18/2019  3   01/16/2019   Alprazolam 0.5 MG Tablet  30.00 30 Tr [facility]  [id number]   Opt (4524)  0  1.00 LME  [place]   2018  2   10/31/2018  Alprazolam 0.5 MG Tablet  30.00 30 Tr Wal  [id number]   Wal (2539)  1  1.00 LME  [place]   10/31/2018  1   10/31/2018  Alprazolam 0.5 MG Tablet  30.00 30 Tr Wal  [id number]   Wal (2349)  0  1.00 L[place]   2018  1   2018  Alprazolam 0.5 MG Tablet  30.00 30 As Dim  [id number]   Wal (3239)  1  1.00 L[place]   2018  1   2018  Hydrocodone-Acetamin 5-325 MG  30.00 10 Ry   [id number]   [facility] (7029)  0  15.00 MME  [place]   2018  1   2018  Alprazolam 0.5 MG Tablet  30.00 30 As Dim  [id number]   Wal (2539)  0  1.00 L[place]   2018  1   2018  Hydrocodone-Acetamin  MG  40.00 10 Ry   [id number]   Wal (1379)  0  40.00 M[place]   2018  1   2018  Hydrocodone-Acetamin 5-300 MG  28.00 7 Pe Loza  [id number]   Wal (265)  0  20.00 M[place]   2018  1   2018  Hydrocodone-Acetamin  MG  40.00 5 Ry   [id number]   Wal ()  0  80.00 MME  [place]   2018  1   2018  Alprazolam 0.25 MG Tablet  30.00 30 Ju Kamla  [id number]   Wal (1599)  0  0.50 LME  [place]   2017  1   2017  Tramadol Hcl 50 MG Tablet  20.00 3 Ca Sli  [id number]   Wal (2989)  0  33.33 MME  [place]   *Per CDC guidance, the MME conversion factors prescribed or provided as part of the medication-assisted treatment for opioid use disorder should not be used to benchmark against dosage thresholds meant for opioids prescribed for pain. Buprenorphine products have no agreed upon morphine equivalency, and as partial opioid agonists, are not expected to be associated with overdose risk in the same dose-dependent manner as doses for full agonist opioids. MME = morphine milligram equivalents. LME = Lorazepam milligram equivalents. MG = dose in milligrams.   PROVIDERS  Total Providers: 6   Name Address City State Zipcode Phone    Conrad ESCOTO Slider [address] [place] [address]    Omi Cason [address] [place] [address]    Eulalio Donovan  [address] [place] [address]    Elliot Loza [address] [address] [address] [place] [address]    Esthela GUAJARDO [address] [place] [address]    Josh Madden [address] [place] [address]    PHARMACIES  Total Pharmacies: 2   Name Address [facility] State Zipcode Phone   [facility] [phone] ; [facility] [place] [id number] [phone]   [facility] [phone]) [phone] [facility] [id number] [phone]

## 2019-09-03 DIAGNOSIS — F41.9 ANXIETY: ICD-10-CM

## 2019-09-03 RX ORDER — ALPRAZOLAM 0.5 MG/1
0.5 TABLET ORAL NIGHTLY PRN
Qty: 30 TAB | Refills: 0
Start: 2019-09-03 | End: 2019-10-03

## 2019-09-03 NOTE — TELEPHONE ENCOUNTER
"Was the patient seen in the last year in this department? Yes 08/29/19     Does patient have an active prescription for medications requested? Yes Filled on     Received Request Via: Pharmacy Changing to Mail order Pharmacy [facility] Per NarxCheck Pt has not filled the Rx on 08/29/19      ROBERTO DOWNEY   Age: 64 demographics Data as of: 09/03/2019          NARCOTIC 130    \"\"75%\"\"95%\"\"99%\"\"0\"\"100\"\"200\"\"300\"\"400\"\"500\"\"600\"\"700\"\"800\"\"900   SEDATIVE 160       STIMULANT 000       NARxSCORES can range from 000 to 999. The first two digits represent the composite percentile risk based on an overall analysis of prescription drug use. The third digit represents the number of active prescriptions. The distribution of scores in the population is such that approximately 75% fall below 200, 95% fall below 500 and 99% fall below 650. The information on this report is not warranted as accurate or complete. This report is based on the search criteria supplied and the data entered by the dispensing pharmacy. For more information about any prescription, please contact the dispensing pharmacy or the prescriber. NARxSCORES and Reports are intended to aid, not replace medical decision making. None of the information presented should be used as sole justification for providing or refusing to provide medications.       RX GRAPH Grayed out drugs could not be included in score calculations.   Narcotic Sedative Stimulant Other   \"\"All Prescribers   \"\"Timeline\"\"09/03\"\"2m\"\"6m\"\"1y\"\"2y\"\"Prescribers\"\"1 - Josh C Walker\"\"2 - Esthela GUAJARDO Dimpel\"\"3 - Eulalio Donovan MD\"\"4 - Elliot Loza\"\"5 - Omi Cason\"\"6 - Candalera L Slide   Morphine MgEq (MME)   \"\"320\"\"200\"\"80\"\"0   \"\"Timeline\"\"09/03\"\"2m\"\"6m\"\"1y\"\"2y   \"\"\"\"\"\"\"\"   \"\"\"\"\"\"\"\"\"\"\"\"   Lorazepam MgEq (LME)   \"\"18\"\"10\"\"2\"\"0   \"\"Timeline\"\"09/03\"\"2m\"\"6m\"\"1y\"\"2y   *Per [facility] guidance, the MME conversion factors prescribed or provided as part of the medication-assisted treatment for opioid use disorder should not be " used to benchmark against dosage thresholds meant for opioids prescribed for pain. Buprenorphine products have no agreed upon morphine equivalency, and as partial opioid agonists, are not expected to be associated with overdose risk in the same dose-dependent manner as doses for full agonist opioids. MME = morphine milligram equivalents. LME = Lorazepam milligram equivalents. mg = dose in milligrams.     Data Analysis                                                                                                                         Summary   Total Prescriptions: 11   Total Prescribers: 6   Total Pharmacies: 2   Narcotics*   (excluding buprenorphine)  Current Qty: 0   Current MME/day: 0.00   30 Day Avg MME/day: 0.00   Buprenorphine*   Current Qty: 0   Current mg/day: 0.00   30 Day Avg mg/day: 0.00   Sedatives*   Current Qty: 0   Current LME/day: 0.00   30 Day Avg LME/day: 0.00   *Highlighted drugs could not be included in score calculations   PRESCRIPTIONS   Total Prescriptions: 11   Total Private Pay: 0     Fill Date ID Written Drug Qty Days Prescriber Rx # Pharmacy Refill Daily Dose * Pymt Type    2019  2  2019  Alprazolam 0.5 MG Tablet    30.00 30 Tr Wal  [id number]  Opt (4530)  0  1.00 LME  [place]   2018  3  10/31/2018  Alprazolam 0.5 MG Tablet    30.00 30 Tr Wal  [id number]  Wal (2539)  1  1.00 LME  [place]   10/31/2018  1  10/31/2018  Alprazolam 0.5 MG Tablet    30.00 30 Tr Wal  [id number]  Wal (2539)  0  1.00 LME  [place]   2018  1  2018  Alprazolam 0.5 MG Tablet    30.00 30 As Dim  [id number]  Wal (2539)  1  1.00 LME  [place]   2018  1  2018  Hydrocodone-Acetamin 5-325 MG    30.00 10 Ry   [id number]  Wal (2539)  0  15.00 MME  [place]   2018  1  2018  Alprazolam 0.5 MG Tablet    30.00 30 As Dim  [id number]  Wal (2539)  0  1.00 LME  [place]   2018  1  2018  Hydrocodone-Acetamin  MG    40.00 10 Ry   [id number]  Sarah (1141)   0  40.00 MME  [place]   2018  1  2018  Hydrocodone-Acetamin 5-300 MG    28.00 7 Pe Loza  [id number]  Wal ()  0  20.00 MME  [place]   2018  1  2018  Hydrocodone-Acetamin  MG    40.00 5 Ry   [id number]  Wal ()  0  80.00 MME  [place]   2018  1  2018  Alprazolam 0.25 MG Tablet    30.00 30 Ju Kamla  [id number]  Wal ()  0  0.50 LME  [place]   2017  1  2017  Tramadol Hcl 50 MG Tablet    20.00 3 Ca Sli  [id number]  Wal ()  0  33.33 MME  [place]   *Per CDC guidance, the MME conversion factors prescribed or provided as part of the medication-assisted treatment for opioid use disorder should not be used to benchmark against dosage thresholds meant for opioids prescribed for pain. Buprenorphine products have no agreed upon morphine equivalency, and as partial opioid agonists, are not expected to be associated with overdose risk in the same dose-dependent manner as doses for full agonist opioids. MME = morphine milligram equivalents. LME = Lorazepam milligram equivalents. MG = dose in milligrams.   PROVIDERS   Total Providers: 6     Name Address City State Zipcode Phone   Conrad SECOTO Slider [address] [place] [address]    Omi Cason [address] [place] [address]    Eulalio Donovan  [address] [place] [address]    Elliot Loza [address] [address] [place] [address]    Josh Madden [address] [place] [address]    Esthela GUAJARDO [address] [place] [address]    PHARMACIES   Total Pharmacies: 2     Name Address City State Zipcode Phone   [facility] [phone]; [place] [address] [place] [address] [phone]   [facility] (8220) [address] [place] [address] [phone]                                                                            ë

## 2019-12-11 RX ORDER — PANTOPRAZOLE SODIUM 40 MG/1
40 TABLET, DELAYED RELEASE ORAL 2 TIMES DAILY
Qty: 180 TAB | Refills: 4 | Status: SHIPPED | OUTPATIENT
Start: 2019-12-11

## 2020-03-05 RX ORDER — NEEDLES, SAFETY 22GX1 1/2"
NEEDLE, DISPOSABLE MISCELLANEOUS
Qty: 3 EACH | Refills: 0 | Status: SHIPPED | OUTPATIENT
Start: 2020-03-05

## 2020-03-05 NOTE — TELEPHONE ENCOUNTER
Received request via: Pharmacy    Was the patient seen in the last year in this department? No  LOV  10/31/18    Does the patient have an active prescription (recently filled or refills available) for medication(s) requested? No

## 2020-03-11 ENCOUNTER — TELEPHONE (OUTPATIENT)
Dept: MEDICAL GROUP | Facility: PHYSICIAN GROUP | Age: 65
End: 2020-03-11

## 2020-03-12 NOTE — TELEPHONE ENCOUNTER
1. Caller Name: Samira from [employer]                        Call Back Number: [phone] with Ref # [id number]      How would the patient prefer to be contacted with a response: Phone call OK to leave a detailed message    Samira from [facility] calling for clarification on TB needles sent in on 03/05. I do not see any injectable medication in pt chart.  Was this sent over by mistake? I left voicemail for pt to return call.

## 2021-03-03 DIAGNOSIS — Z23 NEED FOR VACCINATION: ICD-10-CM

## 2024-10-29 NOTE — TELEPHONE ENCOUNTER
From: Camryn Peter  To: Lynn Buckley D.O.  Sent: 7/16/2018 9:26 AM PDT  Subject: Prescription Question    Hello , I'm going thru a divorce and I'm hyperventilating and anxious walking in circles is there any way Dr Buckley can renew my Xanax? The stress is great I just took my last pill     Thank you  
Alert and oriented to person, place and time/Patient baseline mental status

## (undated) DEVICE — CANISTER SUCTION RIGID RED 1500CC (40EA/CA)

## (undated) DEVICE — NEEDLE W/FACET TIP DULL VERSION W/STIMULATION CABLE SONOPLEX 22G X 2 (10EA/CA)"

## (undated) DEVICE — SENSOR SPO2 NEO LNCS ADHESIVE (20/BX) SEE USER NOTES

## (undated) DEVICE — LENS/HOOD FOR SPACESUIT - (32/PK) PEEL AWAY FACE

## (undated) DEVICE — DRESSING AQUACEL AG ADVANTAGE 3.5 X 10" (10EA/BX)"

## (undated) DEVICE — ROBOTIC SURGERY SERVICES

## (undated) DEVICE — SUTURE 2-0 MONOCRYL SH&UR-6 27 - (12/BX)

## (undated) DEVICE — GOWN WARMING STANDARD FLEX - (30/CA)

## (undated) DEVICE — TUBING CLEARLINK DUO-VENT - C-FLO (48EA/CA)

## (undated) DEVICE — SPATULA PERMANENT CAUTERY DA VINCI 10X'S REUSABLE

## (undated) DEVICE — GLOVE, LITE (PAIR)

## (undated) DEVICE — SUTURE 3-0 MONOCRYL PLUS PS-1 - 27 INCH (36/BX)

## (undated) DEVICE — NEEDLE DRIVER MEGA SUTURECUT DA VINCI 15X'S REUSABLE

## (undated) DEVICE — BLADE SAGITTAL 6 SYSTEM 25MM

## (undated) DEVICE — WATER IRRIG. STER. 1500 ML - (9/CA)

## (undated) DEVICE — GLOVE BIOGEL INDICATOR SZ 7.5 SURGICAL PF LTX - (50PR/BX 4BX/CA)

## (undated) DEVICE — NEPTUNE 4 PORT MANIFOLD - (20/PK)

## (undated) DEVICE — DRAPE COLUMN  BOX OF 20

## (undated) DEVICE — SUTURE 0 VICRYL PLUS CT-2 - 27 INCH (36/BX)

## (undated) DEVICE — SUCTION INSTRUMENT YANKAUER BULBOUS TIP W/O VENT (50EA/CA)

## (undated) DEVICE — SUTURE GENERAL

## (undated) DEVICE — GUIDE PIN

## (undated) DEVICE — GOWN SURGEONS X-LARGE - DISP. (30/CA)

## (undated) DEVICE — GOWN SURGICAL XX-LARGE - (28EA/CA) SIRUS NON REINFORCED

## (undated) DEVICE — WATER IRRIGATION STERILE 1000ML (12EA/CA)

## (undated) DEVICE — CHLORAPREP 26 ML APPLICATOR - ORANGE TINT(25/CA)

## (undated) DEVICE — KIT ROOM DECONTAMINATION

## (undated) DEVICE — SYS BN CMNT HI VAC KT MXR BWL - (MIX-E-VAC II)  (10EA/CA)

## (undated) DEVICE — CANISTER SUCTION 3000ML MECHANICAL FILTER AUTO SHUTOFF MEDI-VAC NONSTERILE LF DISP  (40EA/CA)

## (undated) DEVICE — SEAL 5MM-8MM UNIVERSAL  BOX OF 10

## (undated) DEVICE — TUBE E-T HI-LO CUFF 7.0MM (10EA/PK)

## (undated) DEVICE — SUTURE 1 VICRYL PLUS CT-1 - 18 INCH (12/BX)

## (undated) DEVICE — KIT ANESTHESIA W/CIRCUIT & 3/LT BAG W/FILTER (20EA/CA)

## (undated) DEVICE — FORCEPS MARYLAND BIPOLAR DA VINCI 10X'S REUSABLE

## (undated) DEVICE — NEEDLE INSUFFLATION FOR STEP - (12/BX)

## (undated) DEVICE — GLOVE BIOGEL PI ULTRATOUCH SZ 7.0 SURGICAL PF LF- POWDER FREE (50/BX 4BX/CA)

## (undated) DEVICE — HANDPIECE 10FT INTPLS SCT PLS IRRIGATION HAND CONTROL SET (6/PK)

## (undated) DEVICE — PACK TOTAL HIP - (1/CA)

## (undated) DEVICE — PACK GYN DAVINCI (2EA/CA)

## (undated) DEVICE — PAD OR TABLE DA VINCI 2IN X 20IN X 72IN - (12EA/CA)

## (undated) DEVICE — ELECTRODE 850 FOAM ADHESIVE - HYDROGEL RADIOTRNSPRNT (50/PK)

## (undated) DEVICE — HEAD HOLDER JUNIOR/ADULT

## (undated) DEVICE — SODIUM CHL. IRRIGATION 0.9% 3000ML (4EA/CA 65CA/PF)

## (undated) DEVICE — GLOVE SZ 7.5 LF PROTEXIS (50PR/BX)

## (undated) DEVICE — TUBE CONNECTING SUCTION - CLEAR PLASTIC STERILE 72 IN (50EA/CA)

## (undated) DEVICE — ELECTRODE DUAL RETURN W/ CORD - (50/PK)

## (undated) DEVICE — BLADE SURGICAL CLIPPER - (50EA/CA)

## (undated) DEVICE — LACTATED RINGERS INJ 1000 ML - (14EA/CA 60CA/PF)

## (undated) DEVICE — HUMID-VENT HEAT AND MOISTURE EXCHANGE- (50/BX)

## (undated) DEVICE — SET SUCTION/IRRIGATION WITH DISPOSABLE TIP (6/CA )PART #0250-070-520 IS A SUB

## (undated) DEVICE — SODIUM CHL IRRIGATION 0.9% 1000ML (12EA/CA)

## (undated) DEVICE — NEEDLE DRIVER LARGE DA VINCI 10X'S REUSABLE

## (undated) DEVICE — SLEEVE, VASO, THIGH, MED

## (undated) DEVICE — DRAPE ARM  BOX OF 20

## (undated) DEVICE — MASK ANESTHESIA ADULT  - (100/CA)

## (undated) DEVICE — DRAPETIBURON SHOULDER W/POUCH - (5EA/CA)

## (undated) DEVICE — GLOVE BIOGEL SZ 6 PF LATEX - (50EA/BX 4BX/CA)

## (undated) DEVICE — ARMREST CRADLE FOAM - (2PR/PK 12PR/CA)

## (undated) DEVICE — BLOCK

## (undated) DEVICE — SET EXTENSION WITH 2 PORTS (48EA/CA) ***PART #2C8610 IS A SUBSTITUTE*****

## (undated) DEVICE — GLOVE BIOGEL PI INDICATOR SZ 8.0 SURGICAL PF LF -(50/BX 4BX/CA)

## (undated) DEVICE — SET LEADWIRE 5 LEAD BEDSIDE DISPOSABLE ECG (1SET OF 5/EA)

## (undated) DEVICE — TIP INTPLS HFLO ML ORFC BTRY - (12/CS)  FOR SURGILAV

## (undated) DEVICE — GLOVE BIOGEL SZ 7 SURGICAL PF LTX - (50PR/BX 4BX/CA)

## (undated) DEVICE — SLEEVE, SEQUENTIAL CALF REG

## (undated) DEVICE — GLOVE BIOGEL SZ 8 SURGICAL PF LTX - (50PR/BX 4BX/CA)

## (undated) DEVICE — FORCEPS PROGRASP DA VINCI 10X'S REUSABLE

## (undated) DEVICE — Device

## (undated) DEVICE — PROTECTOR ULNA NERVE - (36PR/CA)

## (undated) DEVICE — SYRINGE ASEPTO - (50EA/CA